# Patient Record
Sex: FEMALE | Race: WHITE | NOT HISPANIC OR LATINO | URBAN - METROPOLITAN AREA
[De-identification: names, ages, dates, MRNs, and addresses within clinical notes are randomized per-mention and may not be internally consistent; named-entity substitution may affect disease eponyms.]

---

## 2022-11-21 ENCOUNTER — NEW PATIENT COMPREHENSIVE (OUTPATIENT)
Dept: URBAN - METROPOLITAN AREA CLINIC 6 | Facility: CLINIC | Age: 87
End: 2022-11-21

## 2022-11-21 DIAGNOSIS — G43.B0: ICD-10-CM

## 2022-11-21 DIAGNOSIS — H35.3132: ICD-10-CM

## 2022-11-21 DIAGNOSIS — Z96.1: ICD-10-CM

## 2022-11-21 PROCEDURE — 92004 COMPRE OPH EXAM NEW PT 1/>: CPT

## 2022-11-21 PROCEDURE — 92015 DETERMINE REFRACTIVE STATE: CPT

## 2022-11-21 ASSESSMENT — VISUAL ACUITY
OD_PH: 20/40
OD_CC: 20/50
OS_CC: 20/60

## 2022-11-21 ASSESSMENT — TONOMETRY
OS_IOP_MMHG: 17
OD_IOP_MMHG: 16

## 2024-01-01 ENCOUNTER — HOSPICE ADMISSION (OUTPATIENT)
Dept: HOME HOSPICE | Facility: HOSPICE | Age: 89
End: 2024-01-01
Payer: MEDICARE

## 2024-01-01 ENCOUNTER — HOME CARE VISIT (OUTPATIENT)
Dept: HOME HEALTH SERVICES | Facility: HOME HEALTHCARE | Age: 89
End: 2024-01-01
Payer: MEDICARE

## 2024-01-01 ENCOUNTER — HOME CARE VISIT (OUTPATIENT)
Dept: HOME HOSPICE | Facility: HOSPICE | Age: 89
End: 2024-01-01
Payer: MEDICARE

## 2024-01-01 ENCOUNTER — TELEPHONE (OUTPATIENT)
Dept: HOME HEALTH SERVICES | Facility: HOME HEALTHCARE | Age: 89
End: 2024-01-01

## 2024-01-01 ENCOUNTER — NURSING HOME VISIT (OUTPATIENT)
Dept: WOUND CARE | Facility: HOSPITAL | Age: 89
End: 2024-01-01
Payer: COMMERCIAL

## 2024-01-01 ENCOUNTER — NURSING HOME VISIT (OUTPATIENT)
Dept: GERIATRICS | Facility: OTHER | Age: 89
End: 2024-01-01
Payer: COMMERCIAL

## 2024-01-01 VITALS
SYSTOLIC BLOOD PRESSURE: 120 MMHG | DIASTOLIC BLOOD PRESSURE: 64 MMHG | TEMPERATURE: 98 F | RESPIRATION RATE: 24 BRPM | HEART RATE: 80 BPM

## 2024-01-01 VITALS — RESPIRATION RATE: 30 BRPM

## 2024-01-01 DIAGNOSIS — T14.8XXA TRAUMATIC INJURY TO SKIN OR SUBCUTANEOUS TISSUE: ICD-10-CM

## 2024-01-01 DIAGNOSIS — R26.2 AMBULATORY DYSFUNCTION: Primary | ICD-10-CM

## 2024-01-01 DIAGNOSIS — L89.150 PRESSURE INJURY OF SACRAL REGION, UNSTAGEABLE (HCC): ICD-10-CM

## 2024-01-01 DIAGNOSIS — Z51.5 HOSPICE CARE PATIENT: ICD-10-CM

## 2024-01-01 DIAGNOSIS — Z51.5 HOSPICE CARE PATIENT: Primary | ICD-10-CM

## 2024-01-01 DIAGNOSIS — J96.21 ACUTE ON CHRONIC RESPIRATORY FAILURE WITH HYPOXIA (HCC): Primary | ICD-10-CM

## 2024-01-01 DIAGNOSIS — T14.8XXA DEGLOVING INJURY: ICD-10-CM

## 2024-01-01 PROCEDURE — G0155 HHCP-SVS OF CSW,EA 15 MIN: HCPCS

## 2024-01-01 PROCEDURE — G0299 HHS/HOSPICE OF RN EA 15 MIN: HCPCS

## 2024-01-01 PROCEDURE — 99308 SBSQ NF CARE LOW MDM 20: CPT | Performed by: NURSE PRACTITIONER

## 2024-01-01 PROCEDURE — 10330057 MEDICATION, GENERAL

## 2024-01-01 PROCEDURE — 10330064 BRIEF, WINGS CHOICE PLUS QUILTED MED (12

## 2024-01-01 PROCEDURE — 10330087 HSPC SERVICE INTENSITY ADD-ON

## 2024-01-01 PROCEDURE — G0156 HHCP-SVS OF AIDE,EA 15 MIN: HCPCS

## 2024-01-01 PROCEDURE — 99309 SBSQ NF CARE MODERATE MDM 30: CPT | Performed by: NURSE PRACTITIONER

## 2024-01-01 RX ORDER — LORAZEPAM 0.5 MG/1
TABLET ORAL
Qty: 30 TABLET | Refills: 0 | Status: SHIPPED | OUTPATIENT
Start: 2024-01-01 | End: 2024-03-01

## 2024-01-01 RX ORDER — MORPHINE SULFATE 20 MG/ML
SOLUTION ORAL
Qty: 30 ML | Refills: 0 | Status: SHIPPED | OUTPATIENT
Start: 2024-01-01 | End: 2024-03-01

## 2024-01-01 RX ORDER — MORPHINE SULFATE 100 MG/5ML
5 SOLUTION ORAL EVERY 2 HOUR PRN
COMMUNITY
End: 2024-01-01 | Stop reason: DRUGHIGH

## 2024-01-02 ENCOUNTER — NURSING HOME VISIT (OUTPATIENT)
Dept: GERIATRICS | Facility: OTHER | Age: 89
End: 2024-01-02
Payer: COMMERCIAL

## 2024-01-02 VITALS
WEIGHT: 74 LBS | HEART RATE: 69 BPM | SYSTOLIC BLOOD PRESSURE: 119 MMHG | RESPIRATION RATE: 20 BRPM | DIASTOLIC BLOOD PRESSURE: 58 MMHG | TEMPERATURE: 97.2 F

## 2024-01-02 DIAGNOSIS — Z51.5 PALLIATIVE CARE PATIENT: ICD-10-CM

## 2024-01-02 DIAGNOSIS — I48.0 PAROXYSMAL ATRIAL FIBRILLATION (HCC): ICD-10-CM

## 2024-01-02 DIAGNOSIS — E03.9 ACQUIRED HYPOTHYROIDISM: ICD-10-CM

## 2024-01-02 DIAGNOSIS — I50.32 CHRONIC DIASTOLIC HEART FAILURE (HCC): ICD-10-CM

## 2024-01-02 DIAGNOSIS — S72.002G CLOSED FRACTURE OF LEFT HIP WITH DELAYED HEALING, SUBSEQUENT ENCOUNTER: Primary | ICD-10-CM

## 2024-01-02 DIAGNOSIS — E87.6 HYPOKALEMIA: ICD-10-CM

## 2024-01-02 DIAGNOSIS — I10 PRIMARY HYPERTENSION: ICD-10-CM

## 2024-01-02 DIAGNOSIS — L89.153 PRESSURE INJURY OF SACRAL REGION, STAGE 3 (HCC): ICD-10-CM

## 2024-01-02 DIAGNOSIS — N18.31 STAGE 3A CHRONIC KIDNEY DISEASE (CKD) (HCC): ICD-10-CM

## 2024-01-02 PROBLEM — S72.002A CLOSED FRACTURE OF LEFT HIP (HCC): Status: ACTIVE | Noted: 2024-01-02

## 2024-01-02 PROCEDURE — 99306 1ST NF CARE HIGH MDM 50: CPT | Performed by: FAMILY MEDICINE

## 2024-01-02 RX ORDER — ATORVASTATIN CALCIUM 20 MG/1
20 TABLET, FILM COATED ORAL DAILY
COMMUNITY

## 2024-01-02 RX ORDER — LEVOTHYROXINE SODIUM 88 UG/1
88 TABLET ORAL DAILY
COMMUNITY

## 2024-01-02 RX ORDER — TORSEMIDE 20 MG/1
20 TABLET ORAL DAILY
COMMUNITY

## 2024-01-02 RX ORDER — MELATONIN/PYRIDOXINE HCL (B6) 10 MG-10MG
10 TABLET,IMMED, EXTENDED RELEASE, BIPHASIC ORAL
COMMUNITY

## 2024-01-02 RX ORDER — METOPROLOL SUCCINATE 25 MG/1
25 TABLET, EXTENDED RELEASE ORAL DAILY
COMMUNITY

## 2024-01-02 RX ORDER — AMLODIPINE BESYLATE 5 MG/1
5 TABLET ORAL DAILY
COMMUNITY
End: 2024-01-10 | Stop reason: ALTCHOICE

## 2024-01-02 RX ORDER — POTASSIUM CHLORIDE 750 MG/1
10 TABLET, EXTENDED RELEASE ORAL DAILY
COMMUNITY
End: 2024-01-08 | Stop reason: DRUGHIGH

## 2024-01-02 NOTE — PROGRESS NOTES
[facility]  [address]  [place] [address]  [facility] 31  History and Physical    NAME: Amanda Orr  AGE: 89 y.o. SEX: female [id number]    DATE OF ENCOUNTER: 1/2/2023    Pain: from wounds on LLE  Rehab Potential:poor  Patient Informed of Medical Condition: yes  Patient is Capable of Understanding Their Right: yes  Prognosis:poor  Discharge Plan: pending OT/PT eval, goal is going home with son  Surrogate Decision Maker: Ayd mitchell  Advanced Directives: yes  Code status:DNR/DNI  PCP: Billy Bolivar MD     Assessment and Plan     Chronic diastolic heart failure (HCC)  Wt Readings from Last 3 Encounters:   01/02/24 33.6 kg (74 lb)   End-stage CHF, following [place] [facility]  Follows cardiology, last visit was 12/7/23. Note reviewed.  Echo 4/12/23 revealed systolic function is low normal with an ejection fraction of 50-55%. There is grade III (severe) diastolic dysfunction.   Continue torsemide 20 mg daily  Monitor vitals closely, BMP    Paroxysmal atrial fibrillation (HCC)  RRR  Continue toprol 25 mg daily and AC with Eliquis 2.5 mg bid. No adverse bleeding noted.    Primary hypertension  Bp stable  Continue amlodipine 5 mg qd, toprol in the setting of Afib and torsemide in the setting of CHF  Monitor vitals closely. Avoid hypotension. Hold parameter ordered.    Palliative care patient  Follows [place] [facility]. Note on 11/20/23 reviewed.    Stage 3a chronic kidney disease (CKD) (HCC)  Stable with creatinine 1.15  Avoid hypotension, avoid NSAIDs  Monitor BMP    Hypokalemia  Low K 3.1 on CMP today 1/2/24  K-dur 20 mEq po qd x2 doses repleted.  Repeat BMP 1/5/24. Order placed.    Pressure injury of sacral region, stage 3 (HCC)  Local wound care  Wound Care consult    Closed fracture of left hip (HCC)  S/p L hemiarthroplasty 12/24, WBAT  Continue Tylenol prn for pain  Outpatient fu with Orthopedics.    Acquired hypothyroidism  Continue levothyroxine 88  mcg daily    All medications and routine orders were reviewed and updated as needed.    Plan discussed with: Patient. Voice messagea left for son. Coordination of care with nursing, OT/PT, SW.    Chief Complaint     Seen for admission at Nursing Facility    History of Present Illness     89 y.o. female who is seen today, following hospitalization at [facility] from 12/23/2023 to 12/31/2023 after a fall sustaining left femur fracture, s/p L hip hemiarthroplasty. Her hospital course was complicated with hypoxia requiring oxygen therapy, acute on chronic end-stage CHF for which cardiology was consulted. Her respiratory status has improved and oxygen was weaned off. She was d/c to [facility].   She is sitting in recliner, c/o pain in LE. She states she lives with her son at home. Uses wheelchair at baseline.   She denies CP, SOB, or palpitations.    HISTORY:  Social History     Socioeconomic History    Marital status: Single     Spouse name: None    Number of children: None     Social Determinants of Health     Financial Resource Strain: Low Risk  (6/5/2023)    Received from [facility]    Overall Financial Resource Strain (CARDIA)     Difficulty of Paying Living Expenses: Not hard at all   Food Insecurity: No Food Insecurity (6/5/2023)    Received from [facility]    Hunger Vital Sign     Worried About Running Out of Food in the Last Year: Never true     Ran Out of Food in the Last Year: Never true   Transportation Needs: No Transportation Needs (6/5/2023)    Received from [facility]    PRAPARE - Transportation     Lack of Transportation (Medical): No     Lack of Transportation (Non-Medical): No   Physical Activity: Not on file   Stress: Not on file   Social Connections: Not on file   Intimate Partner Violence: Not At Risk (6/5/2023)    Received from [facility]    Humiliation, Afraid, Rape, and Kick questionnaire     Fear of Current or Ex-Partner: No      Emotionally Abused: No     Physically Abused: No     Sexually Abused: No   Housing Stability: Low Risk  (6/5/2023)    Received from [facility]    Housing Stability Vital Sign     Unable to Pay for Housing in the Last Year: No     Number of Places Lived in the Last Year: 1     Unstable Housing in the Last Year: No     [place] of Systems     Review of Systems   Cardiovascular:  Negative for chest pain.   Gastrointestinal:  Negative for abdominal pain.   Genitourinary:  Negative for dysuria.   Skin:  Positive for color change and wound.   Neurological:  Negative for headaches.   All other systems reviewed and are negative.    Medications and orders     All medications reviewed and updated in residential EMR.    Objective     Vitals:    01/02/24 0730   BP: 119/58   Pulse: 69   Resp: 20   Temp: (!) 97.2 °F (36.2 °C)   Weight: 33.6 kg (74 lb)        Physical Exam  Vitals and nursing note reviewed.   Constitutional:       General: She is not in acute distress.     Comments: cachectic   HENT:      Head: Normocephalic.      Nose: Nose normal.      Mouth/Throat:      Mouth: Mucous membranes are moist.   Eyes:      General:         Right eye: No discharge.         Left eye: No discharge.      Conjunctiva/sclera: Conjunctivae normal.   Cardiovascular:      Rate and Rhythm: Normal rate and regular rhythm.      Heart sounds: Murmur heard.   Pulmonary:      Effort: Pulmonary effort is normal.      Breath sounds: Rales (b/l) present.   Abdominal:      General: Bowel sounds are normal. There is no distension.      Palpations: Abdomen is soft.      Tenderness: There is no abdominal tenderness.   Musculoskeletal:         General: Deformity (b/l hands DIP and PIP deviation 2/2 arthritis) present.      Cervical back: Neck supple.      Right lower leg: No edema.      Left lower leg: No edema.   Skin:     General: Skin is warm.      Findings: Bruising (ecchymoses left chest, b/l arms) and lesion (7cm laceration with  scab anterior LLE) present.      Comments: Pressure injuries posterior b/l LE, serosanguineous discharge   Neurological:      Mental Status: She is alert and oriented to person, place, and time.   Psychiatric:         Behavior: Behavior normal.       Pertinent Laboratory/Diagnostic Studies:   The following labs/studies were reviewed please see chart or hospital paperwork for details.  CBC 1/2/24 with WBC 6.1, Hgb 10.1, Hct 32, , MCV 82, MCH 25.4  BMP BUN 62, Creatinine 1.15, Na 144, K 3.1.  CBC 12/29/23 with WBC 8.5, Hgb 9.1, MCV 81.   BMP 12/29/23 Na 141, K 5.3, Cre 1.56    I have spent 75 minutes with Patient and family today including taking history from  patient, review of records, charting, and counseling regarding diagnosis and management of conditions.    Martha Romero MD  1/2/2024

## 2024-01-03 NOTE — ASSESSMENT & PLAN NOTE
Low K 3.1 on CMP today 1/2/24  K-dur 20 mEq po qd x2 doses repleted.  Repeat BMP 1/5/24. Order placed.

## 2024-01-03 NOTE — ASSESSMENT & PLAN NOTE
Bp stable  Continue amlodipine 5 mg qd, toprol in the setting of Afib and torsemide in the setting of CHF  Monitor vitals closely. Avoid hypotension. Hold parameter ordered.

## 2024-01-03 NOTE — ASSESSMENT & PLAN NOTE
Wt Readings from Last 3 Encounters:   01/02/24 33.6 kg (74 lb)   End-stage CHF, following [place] [facility]  Follows cardiology, last visit was 12/7/23. Note reviewed.  Echo 4/12/23 revealed systolic function is low normal with an ejection fraction of 50-55%. There is grade III (severe) diastolic dysfunction.   Continue torsemide 20 mg daily  Monitor vitals closely, BMP   Mr. Mcmahon

## 2024-01-04 ENCOUNTER — NURSING HOME VISIT (OUTPATIENT)
Dept: GERIATRICS | Facility: OTHER | Age: 89
End: 2024-01-04
Payer: COMMERCIAL

## 2024-01-04 DIAGNOSIS — S72.002D CLOSED FRACTURE OF LEFT HIP WITH ROUTINE HEALING, SUBSEQUENT ENCOUNTER: Primary | ICD-10-CM

## 2024-01-04 DIAGNOSIS — I10 PRIMARY HYPERTENSION: ICD-10-CM

## 2024-01-04 DIAGNOSIS — Z51.5 PALLIATIVE CARE PATIENT: ICD-10-CM

## 2024-01-04 DIAGNOSIS — L89.153 PRESSURE INJURY OF SACRAL REGION, STAGE 3 (HCC): ICD-10-CM

## 2024-01-04 DIAGNOSIS — N18.4 CKD (CHRONIC KIDNEY DISEASE), STAGE IV (HCC): ICD-10-CM

## 2024-01-04 DIAGNOSIS — I48.0 PAROXYSMAL ATRIAL FIBRILLATION (HCC): ICD-10-CM

## 2024-01-04 DIAGNOSIS — E87.6 HYPOKALEMIA: ICD-10-CM

## 2024-01-04 DIAGNOSIS — R53.81 DEBILITY: ICD-10-CM

## 2024-01-04 DIAGNOSIS — I50.32 CHRONIC DIASTOLIC HEART FAILURE (HCC): ICD-10-CM

## 2024-01-04 PROBLEM — E03.9 ACQUIRED HYPOTHYROIDISM: Status: ACTIVE | Noted: 2024-01-04

## 2024-01-04 PROCEDURE — 99309 SBSQ NF CARE MODERATE MDM 30: CPT | Performed by: NURSE PRACTITIONER

## 2024-01-04 NOTE — PROGRESS NOTES
Facility: [facility]  POS: 31 (STR)  Progress Note    Chief Complaint/Reason for visit: STR follow up visit  Code status: Unknown at this time  History of Present Illness: 89-year-old female seen and examined for STR follow-up of acute and chronic medical conditions.  Received patient seated in chair and appeared in no distress.  Patient's son was present and did not have any questions.  Patient reports feeling frustrated over slow progress.  Reassured patient that it will take some time and that is why she is at the rehab center.  See A/P for additional information.  Past Medical History:   Chronic diastolic heart failure, paroxysmal atrial fibrillation, mild to moderate mitral regurgitation, mixed hyperlipidemia, essential hypertension, CKD stage IV, chronic anticoagulation, history of chronic left posterior rib fractures, hiatal anemia, chronic T3, T4 compression fractures, T6, T7, and T10 SP compression fractures, T11 severe compression deformity, T6 kyphosis  Family History: unchanged from history and physical  Social History: unchanged from history and physical  Review of systems: As per review of medical illness, all other systems reviewed and negative.  Medications: All medication and routine orders were reviewed and updated  Allergies: Reviewed and unchanged  Consults reviewed:PT, OT, and Other cardiology notes  Labs/Diagnostics (reviewed by this provider): Copy in Chart    Imaging Reviewed: None today   Physical Exam  Weight: 73.5 pounds     Temp: 98           BP: 92/45  pulse: 77 resp: 18      Constitutional: Cachetic  Orientation:Person and Place     Physical Exam  Vitals and nursing note reviewed.   Constitutional:       General: She is not in acute distress.     Appearance: She is not toxic-appearing or diaphoretic.      Comments: Frail and thin female who appears with chronic illness.  In no distress.   HENT:      Head: Normocephalic.      Ears:      Comments: Hard of hearing.     Nose: No  congestion.      Mouth/Throat:      Mouth: Mucous membranes are moist.      Pharynx: No oropharyngeal exudate.   Eyes:      Extraocular Movements: Extraocular movements intact.      Conjunctiva/sclera: Conjunctivae normal.   Cardiovascular:      Rate and Rhythm: Normal rate.   Pulmonary:      Effort: Pulmonary effort is normal. No respiratory distress.   Abdominal:      General: Bowel sounds are normal. There is no distension.      Palpations: Abdomen is soft.      Tenderness: There is no abdominal tenderness. There is no guarding.   Musculoskeletal:      Cervical back: Neck supple. No rigidity.      Right lower leg: No edema.      Left lower leg: No edema.      Comments: Moves all 4 extremities.   Skin:     General: Skin is warm and dry.      Capillary Refill: Capillary refill takes less than 2 seconds.      Comments: Left hip surgical wound intact with staples, pressure ulcer on sacrum, skin tear on left elbow, skin tear right lateral calf, skin tear left lateral calf.   Neurological:      Mental Status: She is alert and oriented to person, place, and time.      Motor: Weakness present.      Gait: Gait abnormal.   Psychiatric:         Behavior: Behavior normal.         Thought Content: Thought content normal.       Assessment/Plan:  89-year-old female with:    Closed fracture of left hip  S/p fall sustaining left femur fracture  S/p left hip hemiarthroplasty at [facility]  Patient is on Eliquis twice daily for atrial fibrillation which also serves as DVT prophylaxis  Recent CBC stable 1/2/2024  Continue weightbearing as tolerated left lower extremity  Nursing to monitor for signs and symptoms of infection left hip surgical site  Follow-up with orthopedics as scheduled outpatient    Chronic diastolic heart failure  With recent acute on chronic CHF inpatient, evaluated by cardiology  Echo 4/11/2023: LVEF 50 to 55%.  There is grade 3 diastolic dysfunction.  There is mild to moderate mitral valve  regurgitation  Euvolemic on exam  Continue torsemide 20 mg daily; will add hold parameter due to soft blood pressures  Patient received potassium supplement 20 meq x2 doses for serum potassium level of 3.1 on 1/2/2024  Routine follow-up with cardiology outpatient    Hypokalemia  Serum potassium level 3.1 on 1/2/2024  Order potassium supplement 20 meq daily   Scheduled BMP left femur fracture on 1/5    Primary hypertension  With soft BPs noted  Will discontinue Norvasc  Patient is currently on torsemide 20 mg daily for CHF  Monitor BP trends    Paroxysmal atrial fibrillation  Heart rate stable on exam  Continue metoprolol succinate 25 mg daily; will add hold parameter  Follow-up with [facility] cardiology outpatient    Stage IV chronic kidney disease  Baseline creatinine approximately 1.5-1.7 per record review  Most recent creatinine 1.15/GFR 13 on 1/2/2024  Avoid nephrotoxic medications such as NSAIDs  Avoid hypotension  Ensure adequate hydration  Continue to monitor BMP trends    Pressure injury of sacral region, stage III  Has air mattress   Currently managed by SNF wound team  Frequent repositioning/offloading  Order nutritional supplements to help with healing  Consult [facility] wound CRNP    Palliative care patient  Follows [facility] palliative care service    Debility  Multifactorial  Continue supportive care at SNF for ADLs  Continue PT/OT  Continue safety/fall precautions  Ensure adequate hydration and nutrition  Skin breakdown prevention measures in place by nursing staff such as air mattress, heel protectors     This note was completed in part utilizing [employer] direct voice recognition software.  Grammatical errors, random word insertion, spelling mistakes, and incomplete sentences may be an occasional consequence of the system secondary to software limitations, ambient noise and hardware issues.  At the time of dictation, efforts were made to edit, clarify and/or correct errors.   Please read the chart carefully and recognize, using context, where substitutions have occurred.  If you have any questions or concerns about the context, text or information contained within the body of this dictation, please contact myself, the provider, for further clarification.    YOAN Wade  1/5/20248:56 PM

## 2024-01-08 ENCOUNTER — NURSING HOME VISIT (OUTPATIENT)
Dept: GERIATRICS | Facility: OTHER | Age: 89
End: 2024-01-08
Payer: COMMERCIAL

## 2024-01-08 DIAGNOSIS — S72.002D CLOSED FRACTURE OF LEFT HIP WITH ROUTINE HEALING, SUBSEQUENT ENCOUNTER: Primary | ICD-10-CM

## 2024-01-08 DIAGNOSIS — I48.0 PAROXYSMAL ATRIAL FIBRILLATION (HCC): ICD-10-CM

## 2024-01-08 DIAGNOSIS — N18.4 CKD (CHRONIC KIDNEY DISEASE), STAGE IV (HCC): ICD-10-CM

## 2024-01-08 DIAGNOSIS — R53.81 DEBILITY: ICD-10-CM

## 2024-01-08 DIAGNOSIS — I50.32 CHRONIC DIASTOLIC HEART FAILURE (HCC): ICD-10-CM

## 2024-01-08 DIAGNOSIS — E87.6 HYPOKALEMIA: ICD-10-CM

## 2024-01-08 DIAGNOSIS — I10 PRIMARY HYPERTENSION: ICD-10-CM

## 2024-01-08 PROCEDURE — 99309 SBSQ NF CARE MODERATE MDM 30: CPT | Performed by: NURSE PRACTITIONER

## 2024-01-08 RX ORDER — POTASSIUM CHLORIDE 1.5 G/1.58G
20 POWDER, FOR SOLUTION ORAL DAILY
COMMUNITY
End: 2024-01-10 | Stop reason: ALTCHOICE

## 2024-01-08 NOTE — ASSESSMENT & PLAN NOTE
Amlodipine was discontinued due to soft BPs  Continue Toprol in the setting of atrial fibrillation and torsemide in the setting of CHF  Continue to monitor BP trends and adjust medications if clinically indicated

## 2024-01-08 NOTE — ASSESSMENT & PLAN NOTE
S/p Left hemiarthroplasty 12/24/2023   Neurovascular status intact to left lower extremity  No complaints of pain at time of exam  Continue Tylenol as needed for pain  Continue PT/OT  Follow-up with orthopedics outpatient

## 2024-01-08 NOTE — ASSESSMENT & PLAN NOTE
S/p L hemiarthroplasty 12/24, WBAT  Continue Tylenol prn for pain  Outpatient fu with Orthopedics.   PAST MEDICAL HISTORY:  GERD (gastroesophageal reflux disease)     H/O left bundle branch block     H/O pneumothorax repaired with pleurodesis    H/O: osteoarthritis     History of IBS     HTN (hypertension)     Hypothyroid     Sjogren's disease

## 2024-01-08 NOTE — ASSESSMENT & PLAN NOTE
Multifactorial  Continue supportive care at SNF  Continue PT/OT  Continue safety/fall precautions  Ensure adequate hydration and nutrition

## 2024-01-08 NOTE — ASSESSMENT & PLAN NOTE
Most recent creatinine 1.31 on 1/5/2024  Estimated creatinine clearance of 28 ML/ Min as per Cockcroft-Gault formula  Avoid nephrotoxic medications  Avoid hypotension  Ensure adequate hydration   Monitor BMP trends

## 2024-01-08 NOTE — ASSESSMENT & PLAN NOTE
Wt Readings from Last 3 Encounters:   01/02/24 33.6 kg (74 lb)   Euvolemic on exam  Continue torsemide 20 mg daily; appetite and oral intake has been poor so we will monitor closely for dehydration as patient may not need torsemide  Potassium chloride was ordered on 1/4/2024 but was never started by nursing staff?  Katty ordered potassium chloride 20 mEq daily and check BMP in a.m.  Continue CHF pathway/daily weights

## 2024-01-08 NOTE — ASSESSMENT & PLAN NOTE
Heart rate stable  Continue Toprol XL 25 mg daily  Continue Eliquis 2.5 mg twice daily  Recent CBC stable

## 2024-01-08 NOTE — PROGRESS NOTES
Facility: [facility]  POS: 31 (STR)  Progress Note    Chief Complaint/Reason for visit: STR follow up visit  Code status: Full code  History of Present Illness: 89-year-old female seen and examined for STR follow-up of acute and chronic medical conditions.  Received patient resting in bed and appeared in no distress.  Patient denies having pain or discomfort at time of exam.  No nonverbal signs of pain noted.  Nursing reports that patient is a max assist of 2 for ADLs.  Patient reports feeling short of breath at times which is not new.  Respiratory status stable at time of exam.  Nursing reports that patient has been coughing on thin liquids and speech therapist consulted.  See A/P for additional information.  Past Medical History: unchanged from history and physical  Family History: unchanged from history and physical  Social History: unchanged from history and physical  Review of systems: As per review of medical illness, all other systems reviewed and negative.  Medications: All medication and routine orders were reviewed and updated  Allergies: Reviewed and unchanged  Consults reviewed:PT, OT, and Other  Labs/Diagnostics (reviewed by this provider): Copy in Chart    Imaging Reviewed: None today  Physical Exam  Weight: 72.4 lb Temp: 98          BP: 120/68  Pulse: 56 Resp: 18       Constitutional: Cachetic  Orientation:Person and Place     Physical Exam  Vitals and nursing note reviewed.   Constitutional:       General: She is not in acute distress.     Appearance: She is not toxic-appearing or diaphoretic.      Comments: Frail and thin elderly female who appears with chronic illness.  In no distress.   HENT:      Head: Normocephalic.      Nose: No congestion.      Mouth/Throat:      Mouth: Mucous membranes are moist.      Pharynx: No oropharyngeal exudate.   Cardiovascular:      Rate and Rhythm: Normal rate and regular rhythm.   Pulmonary:      Effort: Pulmonary effort is normal. No respiratory distress.       Comments: Short of breath on exertion.  Abdominal:      General: Bowel sounds are normal. There is no distension.      Palpations: Abdomen is soft.      Tenderness: There is no abdominal tenderness. There is no guarding.   Musculoskeletal:      Cervical back: Neck supple. No rigidity.      Right lower leg: No edema.      Left lower leg: No edema.      Comments: Moves all 4 extremities.   Skin:     General: Skin is warm and dry.      Capillary Refill: Capillary refill takes less than 2 seconds.      Comments: Surgical incision left hip with staples intact.  Pressure ulcer sacrum.  Skin tears lower extremities.   Neurological:      Mental Status: She is alert. Mental status is at baseline.      Motor: Weakness present.   Psychiatric:         Mood and Affect: Mood normal.         Behavior: Behavior normal.         Thought Content: Thought content normal.       Assessment/Plan:  89-year-old female with:    Closed fracture of left hip (HCC)  S/p Left hemiarthroplasty 12/24/2023   Neurovascular status intact to left lower extremity  No complaints of pain at time of exam  Patient is on Eliquis for atrial fibrillation which also serves as DVT prophylaxis  Continue Tylenol as needed for pain  Continue PT/OT  Follow-up with orthopedics outpatient    Chronic diastolic heart failure (HCC)  Wt Readings from Last 3 Encounters:   01/02/24 33.6 kg (74 lb)   With 2.4 lb weight loss since 1/1/2024 he has not hide  Continue torsemide 20 mg daily; appetite and oral intake has been poor so we will monitor closely for dehydration as patient may not need torsemide  Potassium chloride was ordered on 1/4/2024 but was never started by nursing staff?  Katty ordered potassium chloride 20 mEq daily and check BMP in a.m.  Continue CHF pathway/daily weights    Hypokalemia  In setting of diuretic use  Potassium supplement 20 mEq ordered daily  Check BMP in a.m.    Paroxysmal atrial fibrillation  Heart rate stable  Continue Toprol XL 25 mg  daily  Continue Eliquis 2.5 mg twice daily  Recent CBC stable    CKD (chronic kidney disease), stage IV (HCC)  Most recent creatinine 1.31 on 1/5/2024  Estimated creatinine clearance of 28 ML/ Min as per Cockcroft-Gault formula  Avoid nephrotoxic medications  Avoid hypotension  Ensure adequate hydration   Monitor BMP trends    Primary hypertension  Amlodipine was discontinued due to soft BPs  Continue Toprol in the setting of atrial fibrillation and torsemide in the setting of CHF  Continue to monitor BP trends and adjust medications if clinically indicated    Debility  Multifactorial  Continue supportive care at SNF  Continue PT/OT  Continue safety/fall precautions  Ensure adequate hydration and nutrition     This note was completed in part utilizing [employer] direct voice recognition software.  Grammatical errors, random word insertion, spelling mistakes, and incomplete sentences may be an occasional consequence of the system secondary to software limitations, ambient noise and hardware issues.  At the time of dictation, efforts were made to edit, clarify and/or correct errors.  Please read the chart carefully and recognize, using context, where substitutions have occurred.  If you have any questions or concerns about the context, text or information contained within the body of this dictation, please contact myself, the provider, for further clarification.    YOAN Wade  1/8/20243:20 PM

## 2024-01-09 ENCOUNTER — NURSING HOME VISIT (OUTPATIENT)
Dept: WOUND CARE | Facility: HOSPITAL | Age: 89
End: 2024-01-09
Payer: COMMERCIAL

## 2024-01-09 DIAGNOSIS — R26.2 AMBULATORY DYSFUNCTION: ICD-10-CM

## 2024-01-09 DIAGNOSIS — T14.8XXA TRAUMATIC INJURY TO SKIN OR SUBCUTANEOUS TISSUE: ICD-10-CM

## 2024-01-09 DIAGNOSIS — L89.153 PRESSURE INJURY OF SACRAL REGION, STAGE 3 (HCC): Primary | ICD-10-CM

## 2024-01-09 DIAGNOSIS — T14.8XXA DEGLOVING INJURY: ICD-10-CM

## 2024-01-09 PROCEDURE — 99305 1ST NF CARE MODERATE MDM 35: CPT | Performed by: NURSE PRACTITIONER

## 2024-01-10 ENCOUNTER — NURSING HOME VISIT (OUTPATIENT)
Dept: GERIATRICS | Facility: OTHER | Age: 89
End: 2024-01-10
Payer: COMMERCIAL

## 2024-01-10 DIAGNOSIS — I48.0 PAROXYSMAL ATRIAL FIBRILLATION (HCC): ICD-10-CM

## 2024-01-10 DIAGNOSIS — I50.32 CHRONIC DIASTOLIC HEART FAILURE (HCC): Primary | ICD-10-CM

## 2024-01-10 DIAGNOSIS — S72.002D CLOSED FRACTURE OF LEFT HIP WITH ROUTINE HEALING, SUBSEQUENT ENCOUNTER: ICD-10-CM

## 2024-01-10 DIAGNOSIS — L89.153 PRESSURE INJURY OF SACRAL REGION, STAGE 3 (HCC): ICD-10-CM

## 2024-01-10 DIAGNOSIS — N18.4 CKD (CHRONIC KIDNEY DISEASE), STAGE IV (HCC): ICD-10-CM

## 2024-01-10 PROCEDURE — 99309 SBSQ NF CARE MODERATE MDM 30: CPT | Performed by: NURSE PRACTITIONER

## 2024-01-10 NOTE — PROGRESS NOTES
[facility] WOUND CARE MANAGEMENT   AND HYPERBARIC MEDICINE CENTER       Patient ID: Amanda Orr is a 89 y.o. female Date of Birth 5/7/1926     Location of Service: [facility]    Performed wound round with: Wound team     Chief Complaint : Multiple wounds    Wound Instructions:  Wound:  Sacrum  Discontinue previous wound order  Cleanse the wound bed with NSS   Apply non-sting skin prep to periwound area  Apply dynashield to wound bed, then cover with mepilex   Frequency : daily and prn for soiling    Wound : Left lateral lower leg and right lateral lower leg  Cleanse with normal saline solution or wound cleanser  Apply Skin-Prep to periwound area  Apply hydrogel to wound bed and cover with ABD and rolled gauze  Daily and as needed for soiling    Wound: Left lower leg anterior, right lower leg anterior, right medial knee  Cleanse with normal saline solution or wound cleanser  Apply Betadine to wound bed  Cover with rolled gauze  Daily and as needed for soiling    Offload all wounds  Turn and reposition frequently,   Increase protein intake.  Monitor for any sign of infection or worsening, inform PCP or patient's primary physician in your facility.      Allergies  Patient has no allergy information on record.      Assessment & Plan:  1. Pressure injury of sacral region, stage 3 (HCC)  Assessment & Plan:  Sacral wound  Full-thickness, with no obvious sign of infection  Local wound care with Myra shield  Continue to offload  Increase protein intake  Follow-up next week        2. Ambulatory dysfunction  Assessment & Plan:  On 24/7 restorative care      3. Degloving injury  Assessment & Plan:  Left lower leg anterior  Covered with eschar, 0.3 depth, with no obvious sign of infection  Local wound care with Betadine  Continue to offload  Follow-up next week      4. Traumatic injury to skin or subcutaneous tissue  Assessment & Plan:  Bilateral lower legs  The wound bed on the left lateral calf and right lateral  calf is granulation, with no drainage, dry,  Local wound care with hydrogel  The wound bed on right anterior lower leg and right medial knee is covered with eschar, local wound care with Betadine  Continue to offload  Increase protein intake  Follow-up next week                 Subjective:   1/9/2024This is a 89 y.o., female referred to our service for wound/ skin alterations on bilateral lower legs and sacrum.Patient have a complex medical history including but not limited to closed fracture of left hip and hypokalemia. Patient was referred by Senior Care Team. Patient was seen in collaboration with the facility wound team.     Wound History: Patient is a poor historian and was not able to provide information related to the wound.  It is not clear where the wound on the bilateral lower legs came from.  Patient previously under the care of hospice.  The wound on the sacrum is pressure ulcer.    Received patient on recliner chair, seems comfortable.  Patient needs assistance with turning and repositioning in bed.  Patient is incontinent of both bowel and bladder.            Review of Systems   Constitutional: Negative.    Respiratory: Negative.     Cardiovascular: Negative.    Musculoskeletal:  Positive for gait problem.   Skin:  Positive for wound.   Psychiatric/Behavioral: Negative.         Objective:    Physical Exam  Constitutional:       Comments: Seems frail   Cardiovascular:      Rate and Rhythm: Normal rate.   Pulmonary:      Effort: Pulmonary effort is normal.   Musculoskeletal:      Comments: LROM   Skin:     Findings: Lesion present.      Comments: Left lower leg anterior: Wound size is 16 x 0.1 x 0.3 cm.,  100% eschar, no drainage, periwound normal, with no obvious sign of infection    Left lateral calf: Wound size is 4 x 2 x 0.1 cm.,  100% granulation, no drainage, periwound normal, no obvious sign of infection    Right lower leg anterior: Wound size is 2 x 1 x 0.1 cm.,  100% eschar, no drainage, no  obvious sign of infection    Left elbow: Wound is healed    Right lateral calf: Wound size is 5 x 2 x 0.1 cm.,  10% slough, 90% granulation, periwound is normal, with no obvious sign of infection, no drainage     Sacrum: Wound size is 2 x 2 times point 1 cm.,  100% red, small amount of serous drainage, periwound is normal, with no obvious sign of infection    Buttocks: Wound is healed              Procedures           Patient's care was coordinated with nursing facility staff. Recent vitals, labs and updated medications were reviewed on EMR or chart system of facility. Past Medical, surgical, social, medication and allergy history and patient's previous records were reviewed and updated as appropriate: Most up-to date information is available in the facility EMR where the patient is currently admitted.    Patient Active Problem List   Diagnosis    Chronic diastolic heart failure ([facility])    Palliative care patient    Paroxysmal atrial fibrillation ([facility])    Primary hypertension    Stage 3a chronic kidney disease (CKD) ([facility])    Closed fracture of left hip ([facility])    Pressure injury of sacral region, stage 3 ([facility])    Hypokalemia    CKD (chronic kidney disease), stage IV ([facility])    Debility    Acquired hypothyroidism    Degloving injury    Ambulatory dysfunction    Traumatic injury to skin or subcutaneous tissue     History reviewed. No pertinent past medical history.  History reviewed. No pertinent surgical history.  Social History     Socioeconomic History    Marital status: Single     Spouse name: None    Number of children: None    Years of education: None    Highest education level: None   Occupational History    None   Tobacco Use    Smoking status: Former     Types: Cigarettes    Smokeless tobacco: Never   Substance and Sexual Activity    Alcohol use: None    Drug use: None    Sexual activity: None   Other Topics Concern    None   Social History Narrative    None     Social Determinants of Health     Financial Resource  Strain: Low Risk  (6/5/2023)    Received from [facility]    Overall Financial Resource Strain (CARDIA)     Difficulty of Paying Living Expenses: Not hard at all   Food Insecurity: No Food Insecurity (6/5/2023)    Received from [facility]    Hunger Vital Sign     Worried About Running Out of Food in the Last Year: Never true     Ran Out of Food in the Last Year: Never true   Transportation Needs: No Transportation Needs (6/5/2023)    Received from [facility]    PRAPARE - Transportation     Lack of Transportation (Medical): No     Lack of Transportation (Non-Medical): No   Physical Activity: Not on file   Stress: Not on file   Social Connections: Not on file   Intimate Partner Violence: Not At Risk (6/5/2023)    Received from [facility]    Humiliation, Afraid, Rape, and Kick questionnaire     Fear of Current or Ex-Partner: No     Emotionally Abused: No     Physically Abused: No     Sexually Abused: No   Housing Stability: Low Risk  (6/5/2023)    Received from [facility]    Housing Stability Vital Sign     Unable to Pay for Housing in the Last Year: No     Number of Places Lived in the Last Year: 1     Unstable Housing in the Last Year: No        Current Outpatient Medications:     amLODIPine (NORVASC) 5 mg tablet, Take 5 mg by mouth daily, Disp: , Rfl:     apixaban (Eliquis) 2.5 mg, Take 2.5 mg by mouth 2 (two) times a day, Disp: , Rfl:     atorvastatin (LIPITOR) 20 mg tablet, Take 20 mg by mouth daily, Disp: , Rfl:     calcium carbonate-vitamin D 250 mg-3.125 mcg per tablet, Take 1 tablet by mouth 2 (two) times a day, Disp: , Rfl:     levothyroxine (Synthroid) 88 mcg tablet, Take 88 mcg by mouth daily, Disp: , Rfl:     Melatonin 10-10 MG TBCR, Take 10 mg by mouth daily at bedtime, Disp: , Rfl:     metoprolol succinate (TOPROL-XL) 25 mg 24 hr tablet, Take 25 mg by mouth daily, Disp: , Rfl:     potassium chloride (KLOR-CON) 20 mEq  "packet, Take 20 mEq by mouth daily, Disp: , Rfl:     torsemide (DEMADEX) 20 mg tablet, Take 20 mg by mouth daily, Disp: , Rfl:     traMADol-acetaminophen (ULTRACET) 37.5-325 mg per tablet, Take 1 tablet by mouth every 8 (eight) hours as needed for moderate pain, Disp: , Rfl:   History reviewed. No pertinent family history.           Coordination of Care: Wound team aware of the treatment plan. Facility nurse will provide wound treatment and monitor the wound for any changes.     Patient / Staff education : Patient / Staff was given education on sign of infection and pressure ulcer prevention. Patient/ Staff verbalized understanding     Follow up :  Next week    Voice-recognition software may have been used in the preparation of this document. Occasional wrong word or \"sound-alike\" substitutions may have occurred due to the inherent limitations of voice recognition software. Interpretation should be guided by context.      YOAN Germain  "

## 2024-01-10 NOTE — ASSESSMENT & PLAN NOTE
S/p left hemiarthroplasty 12/24/2023  Neurovascular status intact to left lower extremity  No signs of infection  Continue Tylenol as needed for pain  Continue PT/OT  Continue safety/fall precautions  Follow-up with orthopedics outpatient

## 2024-01-10 NOTE — PROGRESS NOTES
Facility: [facility]  POS: 31 (STR)  Progress Note    Chief Complaint/Reason for visit: STR follow up visit  Code status: DNR  History of Present Illness: 89-year-old female seen and examined for STR follow-up of acute and chronic medical conditions.  Received patient seated in chair and appeared in no distress.  No complaints of pain at time of exam.  Elevation in creatinine level yesterday 1.53 and previously 1.31 on 1/5.  Reviewed BMP trends back 3 years and it appears that patient's creatinine has been trending 1.3 to 1.6 with some isolated higher elevations likely due to acute illness.  Lasix and potassium were both discontinued yesterday and BMP scheduled for 1/12/2024.  Encouraged patient to drink fluids.  Patient was seen by [facility] wound CRNP yesterday for sacral wound, left lateral lower leg and right lateral lower leg wounds; notes reviewed. See A/P for additional information.  Past Medical History: unchanged from history and physical  Family History: unchanged from history and physical  Social History: unchanged from history and physical  Review of systems: As per review of medical illness, all other systems reviewed and negative.  Medications: All medication and routine orders were reviewed and updated  Allergies: Reviewed and unchanged  Consults reviewed:PT, OT, and Other body.  Repeat Peabody as a Peabody follow-up 8 9  Labs/Diagnostics (reviewed by this provider): Copy in Chart    Imaging Reviewed: None today  Physical Exam  Weight: 72.4 pounds Temp:           BP: 101/6297.8  pulse: Resp:       O2 Sat:  Constitutional: Frail and thin elderly female  Orientation:Person, Place, and Day     Physical Exam  Vitals and nursing note reviewed.   Constitutional:       General: She is not in acute distress.     Appearance: She is not toxic-appearing or diaphoretic.   HENT:      Head: Normocephalic.      Nose: No congestion.      Mouth/Throat:      Mouth: Mucous membranes are moist.      Pharynx: No  oropharyngeal exudate.   Eyes:      Extraocular Movements: Extraocular movements intact.      Conjunctiva/sclera: Conjunctivae normal.   Cardiovascular:      Rate and Rhythm: Normal rate.   Pulmonary:      Effort: Pulmonary effort is normal. No respiratory distress.      Comments: Shortness of breath noted on exertion which is not new per patient  Abdominal:      General: Bowel sounds are normal. There is no distension.      Palpations: Abdomen is soft.      Tenderness: There is no abdominal tenderness. There is no guarding.   Musculoskeletal:      Right lower leg: No edema.      Left lower leg: No edema.      Comments: Moves all 4 extremities.   Skin:     General: Skin is warm and dry.      Capillary Refill: Capillary refill takes less than 2 seconds.      Comments:  red perianal area.  Dressings intact to bilateral lower extremities and sacral area.   Neurological:      Mental Status: She is alert. Mental status is at baseline.      Motor: Weakness present.      Gait: Gait abnormal.   Psychiatric:         Mood and Affect: Mood normal.         Behavior: Behavior normal.         Thought Content: Thought content normal.       Assessment/Plan:  89-year-old female with:    Chronic diastolic heart failure (HCC)  Wt Readings from Last 3 Encounters:   01/02/24 33.6 kg (74 lb)   Patient appears euvolemic  No complaints of shortness of breath or chest pain  Lasix and potassium were discontinued yesterday due to increase in creatinine level from 1.31 to 1.53.  Reviewed 3-year BMP trends and it appears that patient's creatinine has been trending 1.3-1.6  Will monitor closely off of diuretic  Continue CHF pathway/daily weights    CKD (chronic kidney disease), stage IV ([facility])  Reviewed creatinine trends for the past 3 years  Creatinine 1.65 on 9/6/2020, creatinine 1.38 on 12/13/2022, 1.52 on 4/11/2023  Creatinine1.28 on 6/5/2023, 1.01 on 6/6/2023, 1.70 on 6/20/2023, 1.66 on 7/27/2023, 1.84 on 12/13/2023  Estimated creatinine  clearance of 28 mL/min as per Cockcroft-Gault formula  Avoid nephrotoxic medications such as NSAIDs  Avoid hypotension  Ensure adequate hydration    Closed fracture of left hip (HCC)  S/p left hemiarthroplasty 12/24/2023  Neurovascular status intact to left lower extremity  No signs of infection  Continue Tylenol as needed for pain  Continue PT/OT  Continue safety/fall precautions  Follow-up with orthopedics outpatient    Paroxysmal atrial fibrillation (HCC)  Heart rate controlled on Toprol XL 25 mg daily  Continue eliquis for anticoagulation/stroke prevention    Pressure injury of sacral region, stage 3 (HCC)  Sacral wound managed by wound team at   Continue local wound care with Myra shield as ordered by [facility] wound CRNP  Continue offloading  Continue Dangelo supplement for wound healing     This note was completed in part utilizing [employer] direct voice recognition software.  Grammatical errors, random word insertion, spelling mistakes, and incomplete sentences may be an occasional consequence of the system secondary to software limitations, ambient noise and hardware issues.  At the time of dictation, efforts were made to edit, clarify and/or correct errors.  Please read the chart carefully and recognize, using context, where substitutions have occurred.  If you have any questions or concerns about the context, text or information contained within the body of this dictation, please contact myself, the provider, for further clarification.    YOAN Wade  1/10/17695:20 PM

## 2024-01-10 NOTE — ASSESSMENT & PLAN NOTE
Left lower leg anterior  Covered with eschar, 0.3 depth, with no obvious sign of infection  Local wound care with Betadine  Continue to offload  Follow-up next week

## 2024-01-10 NOTE — ASSESSMENT & PLAN NOTE
Sacral wound  Full-thickness, with no obvious sign of infection  Local wound care with Myra shield  Continue to offload  Increase protein intake  Follow-up next week

## 2024-01-10 NOTE — ASSESSMENT & PLAN NOTE
Bilateral lower legs  The wound bed on the left lateral calf and right lateral calf is granulation, with no drainage, dry,  Local wound care with hydrogel  The wound bed on right anterior lower leg and right medial knee is covered with eschar, local wound care with Betadine  Continue to offload  Increase protein intake  Follow-up next week

## 2024-01-10 NOTE — ASSESSMENT & PLAN NOTE
Reviewed creatinine trends for the past 3 years  Creatinine 1.65 on 9/6/2020, creatinine 1.38 on 12/13/2022, 1.52 on 4/11/2023  Creatinine1.28 on 6/5/2023, 1.01 on 6/6/2023, 1.70 on 6/20/2023, 1.66 on 7/27/2023, 1.84 on 12/13/2023  Estimated creatinine clearance of 28 mL/min as per Cockcroft-Gault formula  Avoid nephrotoxic medications such as NSAIDs  Avoid hypotension  Ensure adequate hydration

## 2024-01-10 NOTE — ASSESSMENT & PLAN NOTE
Wt Readings from Last 3 Encounters:   01/02/24 33.6 kg (74 lb)   Patient appears euvolemic  No complaints of shortness of breath or chest pain  Lasix and potassium were discontinued yesterday due to increase in creatinine level from 1.31-1.53.  Reviewed 3-year BMP trends and it appears that patient's creatinine has been trending 1.3-1.6.   Will monitor closely off of diuretic  Continue CHF pathway/daily weights

## 2024-01-10 NOTE — ASSESSMENT & PLAN NOTE
Heart rate controlled on Toprol XL 25 mg daily  Continue eliquis for anticoagulation/stroke prevention

## 2024-01-12 ENCOUNTER — NURSING HOME VISIT (OUTPATIENT)
Dept: GERIATRICS | Facility: OTHER | Age: 89
End: 2024-01-12
Payer: COMMERCIAL

## 2024-01-12 DIAGNOSIS — S72.002D CLOSED FRACTURE OF LEFT HIP WITH ROUTINE HEALING, SUBSEQUENT ENCOUNTER: ICD-10-CM

## 2024-01-12 DIAGNOSIS — R53.81 DEBILITY: ICD-10-CM

## 2024-01-12 DIAGNOSIS — I48.0 PAROXYSMAL ATRIAL FIBRILLATION (HCC): ICD-10-CM

## 2024-01-12 DIAGNOSIS — I50.32 CHRONIC DIASTOLIC HEART FAILURE (HCC): Primary | ICD-10-CM

## 2024-01-12 DIAGNOSIS — Z51.5 PALLIATIVE CARE PATIENT: ICD-10-CM

## 2024-01-12 DIAGNOSIS — N18.4 CKD (CHRONIC KIDNEY DISEASE), STAGE IV (HCC): ICD-10-CM

## 2024-01-12 PROCEDURE — 99309 SBSQ NF CARE MODERATE MDM 30: CPT | Performed by: NURSE PRACTITIONER

## 2024-01-13 NOTE — ASSESSMENT & PLAN NOTE
Heart rate stable  Continue Toprol XL 25 mg daily  Continue Eliquis for anticoagulation/stroke prevention

## 2024-01-13 NOTE — PROGRESS NOTES
Facility: [facility]  POS: 31 (STR)  Progress Note    Chief Complaint/Reason for visit: STR follow up visit  Code status: DNR  History of Present Illness: 89-year-old female seen and examined for STR follow-up of acute and chronic medical conditions.  Received patient seated in chair and appeared in no distress.  Patient gained 3 pounds and reports having increased shortness of breath with exertion.  Patient reports that she disliked the nutritional supplements and refusing.  [facility] for management of stage III sacral ulcer and bilateral lower extremity wounds on 1/9/2024; notes reviewed.  Past Medical History: unchanged from history and physical  Family History: unchanged from history and physical  Social History: unchanged from history and physical  Review of systems: As per review of medical illness, all other systems reviewed and negative.  Medications: All medication and routine orders were reviewed and updated  Allergies: Reviewed and unchanged  Consults reviewed:PT, OT, and Other  Pertinent Labs/Diagnostics (reviewed by this provider): Copy in Chart  BMP  Imaging Reviewed: None today  Physical Exam  Weight: 75.4 pounds and previously 72.2 pounds Temp: 97.6          BP: 98/61  pulse: 84 resp: 18  Constitutional: Cachetic  Orientation:Person, Place, and Day     Physical Exam  Vitals and nursing note reviewed.   Constitutional:       General: She is not in acute distress.     Appearance: She is ill-appearing. She is not toxic-appearing or diaphoretic.      Comments: Frail and thin female who appears with chronic illness.   HENT:      Head: Normocephalic.      Nose: No congestion.      Mouth/Throat:      Mouth: Mucous membranes are moist.      Pharynx: No oropharyngeal exudate.   Cardiovascular:      Rate and Rhythm: Normal rate.   Pulmonary:      Effort: Pulmonary effort is normal. No respiratory distress.      Comments: With conversational dyspnea noted and also dyspneic  on exertion.  Fine crackles bibasilar with decreased breath sounds.  Abdominal:      General: Bowel sounds are normal. There is no distension.      Palpations: Abdomen is soft.      Tenderness: There is no abdominal tenderness. There is no guarding.   Musculoskeletal:      Right lower leg: Edema (Mild ankle edema) present.      Left lower leg: Edema (Mild ankle edema) present.      Comments: Moves all 4 extremities.   Skin:     General: Skin is warm and dry.      Capillary Refill: Capillary refill takes less than 2 seconds.      Comments: Left hip surgical wound healing.  No signs of infection.   Neurological:      Mental Status: She is alert. Mental status is at baseline.      Motor: Weakness present.      Gait: Gait abnormal.   Psychiatric:         Mood and Affect: Mood normal.         Behavior: Behavior normal.         Thought Content: Thought content normal.       Assessment/Plan:  89-year-old female with:    Chronic diastolic heart failure (HCC)  Wt Readings from Last 3 Encounters:   01/02/24 33.6 kg (74 lb)   Patient gained 3 pounds, with mild ankle edema and crackles bibasilar noted.  Normally short of breath on exertion at baseline but patient reports increased in SOB with exertion  Will order torsemide 20 mg x 1 dose today then start 10 mg daily tomorrow  With soft BPs; may need to start midodrine to maintain blood pressures in order for diuresis  Potassium level 4.4 today  Continue to monitor weights daily    Closed fracture of left hip ([facility])  S/p left hemiarthroplasty 12/24/2023  Neurovascular status intact to left lower extremity  No complaints of pain at time of exam  Continue Tylenol as needed for pain  Continue PT/OT  Follow-up with orthopedics outpatient    CKD (chronic kidney disease), stage IV ([facility])  Creatinine 1.39 today  At risk for worsening kidney failure in setting of diuretic requirement for CHF  Avoid nephrotoxic medications  Renal dose medications  Ensure adequate hydration    Paroxysmal  atrial fibrillation (HCC)  Heart rate stable  Continue Toprol XL 25 mg daily  Continue Eliquis for anticoagulation/stroke prevention    Palliative care patient  Follows [facility] palliative service    Debility  Multifactorial  Continue supportive care at SNF  Continue PT/OT  Continue safety/fall precautions  Ensure adequate hydration and nutrition     This note was completed in part utilizing [employer] direct voice recognition software.  Grammatical errors, random word insertion, spelling mistakes, and incomplete sentences may be an occasional consequence of the system secondary to software limitations, ambient noise and hardware issues.  At the time of dictation, efforts were made to edit, clarify and/or correct errors.  Please read the chart carefully and recognize, using context, where substitutions have occurred.  If you have any questions or concerns about the context, text or information contained within the body of this dictation, please contact myself, the provider, for further clarification.    YOAN Wade  1/12/20248:46 PM

## 2024-01-13 NOTE — ASSESSMENT & PLAN NOTE
Wt Readings from Last 3 Encounters:   01/02/24 33.6 kg (74 lb)   Patient gained 3 pounds, with mild ankle edema and crackles bibasilar noted.  Normally short of breath on exertion at baseline but patient reports increased in SOB with exertion  Will order torsemide 20 mg x 1 dose today then start 10 mg daily tomorrow  With soft BPs; may need to start midodrine to maintain blood pressures in order for diuresis  Potassium level 4.4 today  Continue to monitor weights daily

## 2024-01-13 NOTE — ASSESSMENT & PLAN NOTE
Creatinine 1.39 today  At risk for worsening kidney failure in setting of diuretic requirement for CHF  Avoid nephrotoxic medications  Renal dose medications  Ensure adequate hydration

## 2024-01-15 ENCOUNTER — NURSING HOME VISIT (OUTPATIENT)
Dept: GERIATRICS | Facility: OTHER | Age: 89
End: 2024-01-15
Payer: COMMERCIAL

## 2024-01-15 DIAGNOSIS — T14.8XXA TRAUMATIC INJURY TO SKIN OR SUBCUTANEOUS TISSUE: ICD-10-CM

## 2024-01-15 DIAGNOSIS — I50.32 CHRONIC DIASTOLIC HEART FAILURE (HCC): Primary | ICD-10-CM

## 2024-01-15 DIAGNOSIS — S72.002D CLOSED FRACTURE OF LEFT HIP WITH ROUTINE HEALING, SUBSEQUENT ENCOUNTER: ICD-10-CM

## 2024-01-15 DIAGNOSIS — I48.0 PAROXYSMAL ATRIAL FIBRILLATION (HCC): ICD-10-CM

## 2024-01-15 DIAGNOSIS — L89.153 PRESSURE INJURY OF SACRAL REGION, STAGE 3 (HCC): ICD-10-CM

## 2024-01-15 PROCEDURE — 99309 SBSQ NF CARE MODERATE MDM 30: CPT | Performed by: NURSE PRACTITIONER

## 2024-01-15 NOTE — ASSESSMENT & PLAN NOTE
Sacral wound managed by wound team at [facility]  Continue local wound care with Myra shield as ordered by [facility] wound CRNP  Continue offloading  Encourage patient to drink supplements to help with healing

## 2024-01-15 NOTE — ASSESSMENT & PLAN NOTE
Bilateral lower legs with dressings intact  Managed by wound team at [facility]  Continue local wound care as ordered  Protein was increased by wound CRNP, however patient is refusing even with much encouragement

## 2024-01-15 NOTE — PROGRESS NOTES
Facility: [facility]  POS: 31 (STR)  Progress Note    Chief Complaint/Reason for visit: STR follow up visit  Code status: DNR  History of Present Illness: 89-year-old female seen and examined for STR follow-up of acute and chronic medical conditions.  Received patient seated in chair eating breakfast and appeared in no distress.  Patient is a max assist with all ADLs per nursing staff.  Respiratory status stable on room air.  Her weights have been stable the past 3 days.  Patient is participating in therapy.  See A/P for additional information.  Past Medical History: unchanged from history and physical  Family History: unchanged from history and physical  Social History: unchanged from history and physical  Review of systems: As per review of medical illness, all other systems reviewed and negative.  Medications: All medication and routine orders were reviewed and updated  Allergies: Reviewed and unchanged  Consults reviewed:PT, OT, and Other  Pertinent Labs/Diagnostics (reviewed by this provider): Copy in Chart paper chart  Imaging Reviewed: None today  Physical Exam  Weight: 70.8 pounds   Temp: 98          BP: 100/56  pulse: 88 resp: 18  Constitutional: Cachetic  Orientation:Person and Place     Physical Exam  Vitals and nursing note reviewed.   Constitutional:       General: She is not in acute distress.     Appearance: She is ill-appearing. She is not toxic-appearing or diaphoretic.      Comments: Cachectic appearance   HENT:      Nose: No congestion.      Mouth/Throat:      Pharynx: No oropharyngeal exudate.   Eyes:      Extraocular Movements: Extraocular movements intact.      Conjunctiva/sclera: Conjunctivae normal.   Cardiovascular:      Rate and Rhythm: Normal rate.   Pulmonary:      Effort: Pulmonary effort is normal. No respiratory distress.      Comments: No conversational dyspnea noted today.  Decreased breath sounds bibasilar.  Abdominal:      General: Bowel sounds are normal. There is no  distension.      Palpations: Abdomen is soft.      Tenderness: There is no abdominal tenderness. There is no guarding.   Musculoskeletal:      Right lower leg: No edema.      Left lower leg: No edema.      Comments: Moves all 4 extremities.   Skin:     General: Skin is warm and dry.      Capillary Refill: Capillary refill takes less than 2 seconds.   Neurological:      Mental Status: She is alert. Mental status is at baseline.   Psychiatric:         Mood and Affect: Mood normal.         Behavior: Behavior normal.         Thought Content: Thought content normal.       Assessment/Plan:  89-year-old female with:    Chronic diastolic heart failure (HCC)  Wt Readings from Last 3 Encounters:   01/02/24 33.6 kg (74 lb)   Patient's weight stable past 3 days  Currently on torsemide 10 mg daily  SBPs 100s-110s the past two days  Continue CHF pathway/daily weights  We will continue to monitor closely    Paroxysmal atrial fibrillation (HCC)  Heart rate stable on Toprol-XL 25 mg daily  Continue Eliquis for anticoagulation/stroke prevention    Closed fracture of left hip (HCC)  S/p left hemiarthroplasty 12/24/2023  Neurovascular status intact to left lower extremity  No complaints of pain at time of exam  Continue Tylenol as needed for pain  Continue PT/OT  Continue safety/fall precautions  Follow-up with orthopedics outpatient    Traumatic injury to skin or subcutaneous tissue  Bilateral lower legs with dressings intact  Managed by wound team at [facility]  Continue local wound care as ordered  Protein was increased by wound CRNP, however patient is refusing even with much encouragement    Pressure injury of sacral region, stage 3 (HCC)  Sacral wound managed by wound team at [facility]  Continue local wound care with Myra shield as ordered by [facility] wound CRNP  Continue offloading  Encourage patient to drink supplements to help with healing     This note was completed in part utilizing [employer] direct  voice recognition software.  Grammatical errors, random word insertion, spelling mistakes, and incomplete sentences may be an occasional consequence of the system secondary to software limitations, ambient noise and hardware issues.  At the time of dictation, efforts were made to edit, clarify and/or correct errors.  Please read the chart carefully and recognize, using context, where substitutions have occurred.  If you have any questions or concerns about the context, text or information contained within the body of this dictation, please contact myself, the provider, for further clarification.    YOAN Wade  1/15/55465:39 PM

## 2024-01-15 NOTE — ASSESSMENT & PLAN NOTE
Heart rate stable on Toprol-XL 25 mg daily  Continue Eliquis for anticoagulation/stroke prevention

## 2024-01-15 NOTE — ASSESSMENT & PLAN NOTE
Wt Readings from Last 3 Encounters:   01/02/24 33.6 kg (74 lb)   Patient's weight stable past 3 days  Currently on torsemide 10 mg daily  SBPs 100s-110s the past two days  Continue CHF pathway/daily weights  We will continue to monitor closely

## 2024-01-15 NOTE — ASSESSMENT & PLAN NOTE
S/p left hemiarthroplasty 12/24/2023  Neurovascular status intact to left lower extremity  No complaints of pain at time of exam  Continue Tylenol as needed for pain  Continue PT/OT  Continue safety/fall precautions  Follow-up with orthopedics outpatient

## 2024-01-17 ENCOUNTER — NURSING HOME VISIT (OUTPATIENT)
Dept: WOUND CARE | Facility: HOSPITAL | Age: 89
End: 2024-01-17
Payer: COMMERCIAL

## 2024-01-17 DIAGNOSIS — T14.8XXA TRAUMATIC INJURY TO SKIN OR SUBCUTANEOUS TISSUE: ICD-10-CM

## 2024-01-17 DIAGNOSIS — T14.8XXA DEGLOVING INJURY: ICD-10-CM

## 2024-01-17 DIAGNOSIS — R26.2 AMBULATORY DYSFUNCTION: ICD-10-CM

## 2024-01-17 DIAGNOSIS — L89.150 PRESSURE INJURY OF SACRAL REGION, UNSTAGEABLE (HCC): Primary | ICD-10-CM

## 2024-01-17 PROCEDURE — 99308 SBSQ NF CARE LOW MDM 20: CPT | Performed by: NURSE PRACTITIONER

## 2024-01-17 NOTE — LETTER
Patient:  Amanda Orr   5/7/1926           YOAN Germain saw Amanda Orr for a wound care visit on 1/17/2024. See below for information relating to this visit.      Chief Complaint   Patient presents with    Follow Up Wound Care Visit        Assessment/Plan:  1. Pressure injury of sacral region, unstageable (HCC)  Assessment & Plan:  Sacral wound  Covered with slough, with no obvious sign of infection  Local wound care with Myra shield - continue  Continue to offload  Order pressure relief cushion for the recliner chair. Patient currently have air mattress.   Increase protein intake. On ice cream, fortified apple sauce, and manohar  Follow-up next week        2. Ambulatory dysfunction    3. Degloving injury  Assessment & Plan:  Left lower leg anterior  Covered with eschar, stable  Local wound care with Betadine  Continue to offload  Follow-up next week      4. Traumatic injury to skin or subcutaneous tissue  Assessment & Plan:  Bilateral lower legs  All wounds improved  Change local wound care to left lateral calf and right lateral calf with Xeroform  The wound bed on right anterior lower leg and right medial knee is covered with eschar, local wound care with Betadine  Continue to offload  Increase protein intake  Follow-up next week               Orders:  Amanda Orr  5/7/1926  Wound:  Sacrum  Discontinue previous wound order  Cleanse the wound bed with NSS   Apply non-sting skin prep to periwound area  Apply dynashield to wound bed, then cover with mepilex   Frequency : daily and prn for soiling    Wound : Left lateral lower leg and right lateral lower leg  Cleanse with normal saline solution or wound cleanser  Apply Skin-Prep to periwound area  Apply xerofoam to wound bed and cover with ABD and rolled gauze  Daily and as needed for soiling    Wound: Left lower leg anterior, right lower leg anterior, right medial knee  Cleanse with normal saline solution or wound cleanser  Apply Betadine to  wound bed  Cover with rolled gauze  Daily and as needed for soiling    Pressure relief cushion for the recliner chair    Offload all wounds  Turn and reposition frequently,   Increase protein intake.  Monitor for any sign of infection or worsening, inform PCP or patient's primary physician in your facility.      Follow Up:  Return in about 1 week (around 1/24/2024).       [facility] hours are 8:00 am - 4:30 pm Monday through Friday. The center phone number is [phone]. You can also contact me directly thru my email at [email or web address] or thru tiger text. If it is an emergency, please contact the PCP or patient's attending physician in your facility.     Sincerely,    Electronically signed by YOAN Germain    Patient : Amanda Orr    5/7/1926

## 2024-01-18 NOTE — PROGRESS NOTES
[facility] WOUND CARE MANAGEMENT   AND HYPERBARIC MEDICINE CENTER       Patient ID: Amanda Orr is a 89 y.o. female Date of Birth 5/7/1926     Location of Service: [facility]    Performed wound round with: Wound team     Chief Complaint : Multiple wounds    Wound Instructions:  Wound:  Sacrum  Discontinue previous wound order  Cleanse the wound bed with NSS   Apply non-sting skin prep to periwound area  Apply dynashield to wound bed, then cover with mepilex   Frequency : daily and prn for soiling    Wound : Left lateral lower leg and right lateral lower leg  Cleanse with normal saline solution or wound cleanser  Apply Skin-Prep to periwound area  Apply xerofoam to wound bed and cover with ABD and rolled gauze  Daily and as needed for soiling    Wound: Left lower leg anterior, right lower leg anterior, right medial knee  Cleanse with normal saline solution or wound cleanser  Apply Betadine to wound bed  Cover with rolled gauze  Daily and as needed for soiling    Pressure relief cushion for the recliner chair    Offload all wounds  Turn and reposition frequently,   Increase protein intake.  Monitor for any sign of infection or worsening, inform PCP or patient's primary physician in your facility.      Allergies  Patient has no allergy information on record.      Assessment & Plan:  1. Pressure injury of sacral region, unstageable (HCC)  Assessment & Plan:  Sacral wound  Covered with slough, with no obvious sign of infection  Local wound care with Myra shield - continue  Continue to offload  Order pressure relief cushion for the recliner chair. Patient currently have air mattress.   Increase protein intake. On ice cream, fortified apple sauce, and manohar  Follow-up next week        2. Ambulatory dysfunction    3. Degloving injury  Assessment & Plan:  Left lower leg anterior  Covered with eschar, stable  Local wound care with Betadine  Continue to offload  Follow-up next week      4. Traumatic injury to skin  or subcutaneous tissue  Assessment & Plan:  Bilateral lower legs  All wounds improved  Change local wound care to left lateral calf and right lateral calf with Xeroform  The wound bed on right anterior lower leg and right medial knee is covered with eschar, local wound care with Betadine  Continue to offload  Increase protein intake  Follow-up next week                   Subjective:   1/9/2024This is a 89 y.o., female referred to our service for wound/ skin alterations on bilateral lower legs and sacrum.Patient have a complex medical history including but not limited to closed fracture of left hip and hypokalemia. Patient was referred by Senior Care Team. Patient was seen in collaboration with the facility wound team.     Wound History: Patient is a poor historian and was not able to provide information related to the wound.  It is not clear where the wound on the bilateral lower legs came from.  Patient previously under the care of hospice.  The wound on the sacrum is pressure ulcer.    Received patient on recliner chair, seems comfortable.  Patient needs assistance with turning and repositioning in bed.  Patient is incontinent of both bowel and bladder.    1/17/2024 Follow up for multiple wounds . Received patient, not in distress. Facility staff did not report any significant issues related to the wound. Denies pain.               Review of Systems   Constitutional: Negative.    Respiratory: Negative.     Cardiovascular: Negative.    Musculoskeletal:  Positive for gait problem.   Skin:  Positive for wound.   Psychiatric/Behavioral: Negative.         Objective:    Physical Exam  Constitutional:       Comments: Seems frail   Cardiovascular:      Rate and Rhythm: Normal rate.   Pulmonary:      Effort: Pulmonary effort is normal.   Musculoskeletal:      Comments: LROM   Skin:     Findings: Lesion present.      Comments: Left lower leg anterior: Wound size is 6 x 1 x 0.3 cm.,  100% eschar, no drainage, periwound  normal, with no obvious sign of infection    Left lateral calf: Wound size is 2 x 1 x 0.1 cm.,  100% epithelial, no drainage, periwound normal, no obvious sign of infection    Right lower leg anterior: Wound size is 2 x 1 cm,  100% eschar, no drainage, no obvious sign of infection    Right lateral calf: Wound size is 4.5 x 2.7 x 0.1 cm.,  100% epithelial,  periwound is normal, with no obvious sign of infection, no drainage     Sacrum: Wound size is 1 x 2.5 x 0.1 cm, 100% slough, small amount of serous drainage, periwound is normal, with no obvious sign of infection                  Procedures           Patient's care was coordinated with nursing facility staff. Recent vitals, labs and updated medications were reviewed on EMR or chart system of facility. Past Medical, surgical, social, medication and allergy history and patient's previous records were reviewed and updated as appropriate: Most up-to date information is available in the facility EMR where the patient is currently admitted.    Patient Active Problem List   Diagnosis    Chronic diastolic heart failure ([facility])    Palliative care patient    Paroxysmal atrial fibrillation ([facility])    Primary hypertension    Stage 3a chronic kidney disease (CKD) ([facility])    Closed fracture of left hip ([facility])    Pressure injury of sacral region, unstageable ([facility])    Hypokalemia    CKD (chronic kidney disease), stage IV ([facility])    Debility    Acquired hypothyroidism    Degloving injury    Ambulatory dysfunction    Traumatic injury to skin or subcutaneous tissue     History reviewed. No pertinent past medical history.  History reviewed. No pertinent surgical history.  Social History     Socioeconomic History    Marital status: Single     Spouse name: None    Number of children: None    Years of education: None    Highest education level: None   Occupational History    None   Tobacco Use    Smoking status: Former     Types: Cigarettes    Smokeless tobacco: Never   Substance and Sexual  Activity    Alcohol use: None    Drug use: None    Sexual activity: None   Other Topics Concern    None   Social History Narrative    None     Social Determinants of Health     Financial Resource Strain: Low Risk  (6/5/2023)    Received from [facility]    Overall Financial Resource Strain (CARDIA)     Difficulty of Paying Living Expenses: Not hard at all   Food Insecurity: No Food Insecurity (6/5/2023)    Received from [facility]    Hunger Vital Sign     Worried About Running Out of Food in the Last Year: Never true     Ran Out of Food in the Last Year: Never true   Transportation Needs: No Transportation Needs (6/5/2023)    Received from [facility]    PRAPARE - Transportation     Lack of Transportation (Medical): No     Lack of Transportation (Non-Medical): No   Physical Activity: Not on file   Stress: Not on file   Social Connections: Not on file   Intimate Partner Violence: Not At Risk (6/5/2023)    Received from [facility]    Humiliation, Afraid, Rape, and Kick questionnaire     Fear of Current or Ex-Partner: No     Emotionally Abused: No     Physically Abused: No     Sexually Abused: No   Housing Stability: Low Risk  (6/5/2023)    Received from [facility]    Housing Stability Vital Sign     Unable to Pay for Housing in the Last Year: No     Number of Places Lived in the Last Year: 1     Unstable Housing in the Last Year: No        Current Outpatient Medications:     apixaban (Eliquis) 2.5 mg, Take 2.5 mg by mouth 2 (two) times a day, Disp: , Rfl:     atorvastatin (LIPITOR) 20 mg tablet, Take 20 mg by mouth daily, Disp: , Rfl:     calcium carbonate-vitamin D 250 mg-3.125 mcg per tablet, Take 1 tablet by mouth 2 (two) times a day, Disp: , Rfl:     levothyroxine (Synthroid) 88 mcg tablet, Take 88 mcg by mouth daily, Disp: , Rfl:     Melatonin 10-10 MG TBCR, Take 10 mg by mouth daily at bedtime, Disp: , Rfl:     metoprolol  "succinate (TOPROL-XL) 25 mg 24 hr tablet, Take 25 mg by mouth daily, Disp: , Rfl:     torsemide (DEMADEX) 20 mg tablet, Take 20 mg by mouth daily, Disp: , Rfl:   History reviewed. No pertinent family history.           Coordination of Care: Wound team aware of the treatment plan. Facility nurse will provide wound treatment and monitor the wound for any changes.     Patient / Staff education : Patient / Staff was given education on sign of infection and pressure ulcer prevention. Patient/ Staff verbalized understanding     Follow up :  Next week    Voice-recognition software may have been used in the preparation of this document. Occasional wrong word or \"sound-alike\" substitutions may have occurred due to the inherent limitations of voice recognition software. Interpretation should be guided by context.      YOAN Germain  "

## 2024-01-18 NOTE — ASSESSMENT & PLAN NOTE
Sacral wound  Covered with slough, with no obvious sign of infection  Local wound care with Myra shield - continue  Continue to offload  Order pressure relief cushion for the recliner chair. Patient currently have air mattress.   Increase protein intake. On ice cream, fortified apple sauce, and manohar  Follow-up next week

## 2024-01-18 NOTE — ASSESSMENT & PLAN NOTE
Bilateral lower legs  All wounds improved  Change local wound care to left lateral calf and right lateral calf with Xeroform  The wound bed on right anterior lower leg and right medial knee is covered with eschar, local wound care with Betadine  Continue to offload  Increase protein intake  Follow-up next week

## 2024-01-18 NOTE — ASSESSMENT & PLAN NOTE
Left lower leg anterior  Covered with eschar, stable  Local wound care with Betadine  Continue to offload  Follow-up next week

## 2024-01-22 ENCOUNTER — TELEPHONE (OUTPATIENT)
Age: 89
End: 2024-01-22

## 2024-01-22 ENCOUNTER — NURSING HOME VISIT (OUTPATIENT)
Dept: GERIATRICS | Facility: OTHER | Age: 89
End: 2024-01-22
Payer: COMMERCIAL

## 2024-01-22 DIAGNOSIS — R26.2 AMBULATORY DYSFUNCTION: ICD-10-CM

## 2024-01-22 DIAGNOSIS — I50.32 CHRONIC DIASTOLIC HEART FAILURE (HCC): Primary | ICD-10-CM

## 2024-01-22 DIAGNOSIS — E03.9 ACQUIRED HYPOTHYROIDISM: ICD-10-CM

## 2024-01-22 DIAGNOSIS — N18.4 CKD (CHRONIC KIDNEY DISEASE), STAGE IV (HCC): ICD-10-CM

## 2024-01-22 DIAGNOSIS — E44.0 MODERATE PROTEIN-CALORIE MALNUTRITION (HCC): ICD-10-CM

## 2024-01-22 DIAGNOSIS — I48.0 PAROXYSMAL ATRIAL FIBRILLATION (HCC): ICD-10-CM

## 2024-01-22 DIAGNOSIS — S72.002D CLOSED FRACTURE OF LEFT HIP WITH ROUTINE HEALING, SUBSEQUENT ENCOUNTER: ICD-10-CM

## 2024-01-22 PROCEDURE — 99309 SBSQ NF CARE MODERATE MDM 30: CPT | Performed by: NURSE PRACTITIONER

## 2024-01-22 RX ORDER — TORSEMIDE 10 MG/1
10 TABLET ORAL DAILY
COMMUNITY

## 2024-01-22 NOTE — TELEPHONE ENCOUNTER
Caller: tung noonan    Doctor: n/a    Reason for call: [facility]    Call back#: N/A

## 2024-01-23 ENCOUNTER — NURSING HOME VISIT (OUTPATIENT)
Dept: WOUND CARE | Facility: HOSPITAL | Age: 89
End: 2024-01-23
Payer: COMMERCIAL

## 2024-01-23 DIAGNOSIS — L89.153 PRESSURE INJURY OF SACRAL REGION, STAGE 3 (HCC): Primary | ICD-10-CM

## 2024-01-23 DIAGNOSIS — T14.8XXA TRAUMATIC INJURY TO SKIN OR SUBCUTANEOUS TISSUE: ICD-10-CM

## 2024-01-23 DIAGNOSIS — R26.2 AMBULATORY DYSFUNCTION: ICD-10-CM

## 2024-01-23 DIAGNOSIS — T14.8XXA DEGLOVING INJURY: ICD-10-CM

## 2024-01-23 PROCEDURE — 99309 SBSQ NF CARE MODERATE MDM 30: CPT | Performed by: NURSE PRACTITIONER

## 2024-01-23 NOTE — PROGRESS NOTES
Facility: [facility]  POS: 31 (STR)  Progress Note    Chief Complaint/Reason for visit: STR follow-up visit  Code status: DNR  History of Present Illness: 89-year-old female seen and examined for STR follow-up of acute and chronic medical conditions.  Received patient seated in chair and appeared in no distress.  Patient reports that she is eating better.  Continues to participate in therapy.  No acute issues reported by nursing staff.  See A/P for additional information.  Past Medical History: unchanged from history and physical  Family History: Unchanged from history and physical  Social History: Unchanged from history and physical  Review of systems: As per review of medical illness, all other systems reviewed and negative.  Medications: All medication and routine orders were reviewed and updated  Allergies: Reviewed and unchanged  Consults reviewed:PT, OT, Speech, and Other  Labs/Diagnostics (reviewed by this provider): Copy in Chart  Imaging Reviewed: None today  Physical Exam  Weight: 70 pounds Temp: 98          BP: 100/60  pulse: 72 resp: 16       Orientation:Person, Place, and Day     Physical Exam  Vitals and nursing note reviewed.   Constitutional:       General: She is not in acute distress.     Appearance: She is not toxic-appearing or diaphoretic.      Comments: Frail cachectic appearing elderly female.  In no distress.   HENT:      Head: Normocephalic.      Nose: No congestion.      Mouth/Throat:      Mouth: Mucous membranes are moist.   Cardiovascular:      Rate and Rhythm: Normal rate.   Pulmonary:      Effort: Pulmonary effort is normal. No respiratory distress.   Abdominal:      General: Bowel sounds are normal. There is no distension.      Palpations: Abdomen is soft.      Tenderness: There is no abdominal tenderness. There is no guarding.   Musculoskeletal:      Cervical back: Neck supple. No rigidity.      Right lower leg: No edema.      Left lower leg: No edema.      Comments: Moves all 4  extremities.   Skin:     General: Skin is warm and dry.      Capillary Refill: Capillary refill takes less than 2 seconds.      Comments: Patient with wounds bilateral lower extremities covered with dressings.  Pressure injury of sacral region.   Neurological:      Mental Status: She is alert. Mental status is at baseline.      Motor: Weakness present.      Gait: Gait abnormal.   Psychiatric:         Mood and Affect: Mood normal.         Behavior: Behavior normal.         Thought Content: Thought content normal.       Assessment/Plan:  89-year-old female with:    Chronic diastolic heart failure (HCC)  Wt Readings from Last 3 Encounters:   01/02/24 33.6 kg (74 lb)   Patient's weights trending 70 to 71 pounds  No signs or symptoms of fluid overload  Continue torsemide 10 mg daily  Continue CHF pathway/daily weights    Closed fracture of left hip (HCC)  S/p hemiarthroplasty 12/24/2023  Neurovascular status intact to left lower extremity  WBAT LLE  Patient ambulated 12 feet with FWW moderate assist  Continue Tylenol as needed for pain  Continue PT/OT  Continue safety/fall precautions    CKD (chronic kidney disease), stage IV (HCC)  Patient is at risk for worsening kidney failure in setting of diuretic requirement for CHF  Avoid nephrotoxic medications such as NSAIDs  Renal dose medications  Avoid hypotension    Acquired hypothyroidism  Continue levothyroxine 88 mcg daily    Moderate protein-calorie malnutrition (HCC)  As evidenced by weight loss, loss of muscle, and subcutaneous tissue   Continue Dangelo nutritional supplement twice daily  Continue regular diet  Monitor weights    Paroxysmal atrial fibrillation (HCC)  Heart rate stable  Continue Toprol XL 25 mg daily  Continue Eliquis for anticoagulation/stroke prevention    Ambulatory dysfunction  Multifactorial  Continue supportive care  Continue PT/OT  Continue safety/fall precautions     This note was completed in part utilizing [employer] direct voice  recognition software.  Grammatical errors, random word insertion, spelling mistakes, and incomplete sentences may be an occasional consequence of the system secondary to software limitations, ambient noise and hardware issues.  At the time of dictation, efforts were made to edit, clarify and/or correct errors.  Please read the chart carefully and recognize, using context, where substitutions have occurred.  If you have any questions or concerns about the context, text or information contained within the body of this dictation, please contact myself, the provider, for further clarification.    YOAN Wade  1/22/20247:19 PM

## 2024-01-23 NOTE — ASSESSMENT & PLAN NOTE
S/p hemiarthroplasty 12/24/2023  Neurovascular status intact to left lower extremity  WBAT LLE  Patient ambulated 12 feet with FWW moderate assist  Continue Tylenol as needed for pain  Continue PT/OT  Continue safety/fall precautions

## 2024-01-23 NOTE — LETTER
Patient:  Amanda Orr   5/7/1926           YOAN Germain saw Amanda Orr for a wound care visit on 1/23/2024. See below for information relating to this visit.      Chief Complaint   Patient presents with    Follow Up Wound Care Visit        Assessment/Plan:  1. Pressure injury of sacral region, stage 3 (HCC)  Assessment & Plan:  Sacrum  Wound improved, increasing granulation  Local wound care with Triad paste  Continue to offload  Increase protein intake  Follow-up next week      2. Ambulatory dysfunction  Assessment & Plan:  On 24/7 restorative care      3. Degloving injury  Assessment & Plan:  Left lower leg anterior  Stable, 100% eschar, periwound is red due to the Xeroform dressing.  Education provided to staff not to apply Xeroform on the patient's wound.  Local wound care with Betadine  Continue to offload  Follow-up next week      4. Traumatic injury to skin or subcutaneous tissue  Assessment & Plan:  Bilateral lower legs  All wounds are stable  Change local wound care to left lateral calf and right lateral calf with bordered foam  The wound bed on right anterior lower leg and right medial knee is covered with eschar, local wound care with Betadine  Continue to offload  Increase protein intake  Follow-up next week               Orders:  Amanda Orr  5/7/1926  Wound:  Sacrum  Discontinue previous wound order  Cleanse the wound bed with NSS   Apply non-sting skin prep to periwound area  Apply triad paste to wound bed, then cover with ABD pad  Frequency : daily and prn for soiling    Wound : Left lateral lower leg and right lateral lower leg  Cleanse with normal saline solution or wound cleanser  Apply Skin-Prep to periwound area  Apply bordered foam to wound bed   Three times a week and as needed for soiling    Wound: Left lower leg anterior, right lower leg anterior, right medial knee  Cleanse with normal saline solution or wound cleanser  Apply Betadine to wound bed, GRETA  Daily and as  needed for soiling    Pressure relief cushion for the recliner chair    Offload all wounds  Turn and reposition frequently,   Increase protein intake.  Monitor for any sign of infection or worsening, inform PCP or patient's primary physician in your facility.      Follow Up:  Return in about 1 week (around 1/30/2024).       [facility] hours are 8:00 am - 4:30 pm Monday through Friday. The center phone number is [phone]. You can also contact me directly thru my email at [email or web address] or thru tiger text. If it is an emergency, please contact the PCP or patient's attending physician in your facility.     Sincerely,    Electronically signed by YOAN Germain    Patient : Amanda Orr    5/7/1926

## 2024-01-23 NOTE — ASSESSMENT & PLAN NOTE
Patient is at risk for worsening kidney failure in setting of diuretic requirement for CHF  Avoid nephrotoxic medications such as NSAIDs  Renal dose medications  Avoid hypotension

## 2024-01-23 NOTE — ASSESSMENT & PLAN NOTE
As evidenced by weight loss, loss of muscle, and subcutaneous tissue   Continue Dangelo nutritional supplement twice daily  Continue regular diet  Monitor weights

## 2024-01-23 NOTE — ASSESSMENT & PLAN NOTE
Wt Readings from Last 3 Encounters:   01/02/24 33.6 kg (74 lb)   Patient's weights trending 70 to 71 pounds  No signs or symptoms of fluid overload  Continue torsemide 10 mg daily  Continue CHF pathway/daily weights

## 2024-01-24 NOTE — ASSESSMENT & PLAN NOTE
Left lower leg anterior  Stable, 100% eschar, periwound is red due to the Xeroform dressing.  Education provided to staff not to apply Xeroform on the patient's wound.  Local wound care with Betadine  Continue to offload  Follow-up next week

## 2024-01-24 NOTE — ASSESSMENT & PLAN NOTE
Sacrum  Wound improved, increasing granulation  Local wound care with Triad paste  Continue to offload  Increase protein intake  Follow-up next week

## 2024-01-24 NOTE — PROGRESS NOTES
[facility] WOUND CARE MANAGEMENT   AND HYPERBARIC MEDICINE CENTER       Patient ID: Amanda Orr is a 89 y.o. female Date of Birth 5/7/1926     Location of Service: [facility]    Performed wound round with: Wound team     Chief Complaint : Multiple wounds    Wound Instructions:  Wound:  Sacrum  Discontinue previous wound order  Cleanse the wound bed with NSS   Apply non-sting skin prep to periwound area  Apply triad paste to wound bed, then cover with ABD pad  Frequency : daily and prn for soiling    Wound : Left lateral lower leg and right lateral lower leg  Cleanse with normal saline solution or wound cleanser  Apply Skin-Prep to periwound area  Apply bordered foam to wound bed   Three times a week and as needed for soiling    Wound: Left lower leg anterior, right lower leg anterior, right medial knee  Cleanse with normal saline solution or wound cleanser  Apply Betadine to wound bed, GRETA  Daily and as needed for soiling    Pressure relief cushion for the recliner chair    Offload all wounds  Turn and reposition frequently,   Increase protein intake.  Monitor for any sign of infection or worsening, inform PCP or patient's primary physician in your facility.      Allergies  Patient has no allergy information on record.      Assessment & Plan:  1. Pressure injury of sacral region, stage 3 ([facility])  Assessment & Plan:  Sacrum  Wound improved, increasing granulation  Local wound care with Triad paste  Continue to offload  Increase protein intake  Follow-up next week      2. Ambulatory dysfunction  Assessment & Plan:  On 24/7 restorative care      3. Degloving injury  Assessment & Plan:  Left lower leg anterior  Stable, 100% eschar, periwound is red due to the Xeroform dressing.  Education provided to staff not to apply Xeroform on the patient's wound.  Local wound care with Betadine  Continue to offload  Follow-up next week      4. Traumatic injury to skin or subcutaneous tissue  Assessment & Plan:  Bilateral  lower legs  All wounds are stable  Change local wound care to left lateral calf and right lateral calf with bordered foam  The wound bed on right anterior lower leg and right medial knee is covered with eschar, local wound care with Betadine  Continue to offload  Increase protein intake  Follow-up next week                     Subjective:   1/9/2024This is a 89 y.o., female referred to our service for wound/ skin alterations on bilateral lower legs and sacrum.Patient have a complex medical history including but not limited to closed fracture of left hip and hypokalemia. Patient was referred by Senior Care Team. Patient was seen in collaboration with the facility wound team.     Wound History: Patient is a poor historian and was not able to provide information related to the wound.  It is not clear where the wound on the bilateral lower legs came from.  Patient previously under the care of hospice.  The wound on the sacrum is pressure ulcer.    Received patient on recliner chair, seems comfortable.  Patient needs assistance with turning and repositioning in bed.  Patient is incontinent of both bowel and bladder.    1/17/2024 Follow up for multiple wounds . Received patient, not in distress. Facility staff did not report any significant issues related to the wound. Denies pain.     1/23/2024 Follow up for wound on the multiple wounds . Received patient, not in distress. Facility staff did not report any significant issues related to the wound. Treatment removed on the left lower leg anterior wound - xerofoam.                 Review of Systems   Constitutional: Negative.    Respiratory: Negative.     Cardiovascular: Negative.    Musculoskeletal:  Positive for gait problem.   Skin:  Positive for wound.   Psychiatric/Behavioral: Negative.         Objective:    Physical Exam  Constitutional:       Comments: Seems frail   Cardiovascular:      Rate and Rhythm: Normal rate.   Pulmonary:      Effort: Pulmonary effort is  normal.   Musculoskeletal:      Comments: LROM   Skin:     Findings: Lesion present.      Comments: Left lower leg: Wound size is 6 x 1 cm.,  100% eschar, periwound is red, no drainage, no obvious sign of infection    Left lateral calf: Wound size is 1.5 x 1 x 0.1 cm.,  100% epithelial, small amount of serous drainage, periwound normal, with no obvious sign of infection    Right lower leg: Wound size is 2 x 5 cm.,  100% eschar, no drainage, no obvious sign of infection    Right lateral calf: Wound size is 4 x 1.5 times point 1 cm.,  100% epithelial, small amount of serous drainage, periwound normal, with no obvious sign of infection    Sacrum: Wound size is 2 x 2.5 x 0.1 cm.,  100% granulation, periwound is normal, small amount of serous drainage, with no obvious sign of infection              Procedures           Patient's care was coordinated with nursing facility staff. Recent vitals, labs and updated medications were reviewed on EMR or chart system of facility. Past Medical, surgical, social, medication and allergy history and patient's previous records were reviewed and updated as appropriate: Most up-to date information is available in the facility EMR where the patient is currently admitted.    Patient Active Problem List   Diagnosis    Chronic diastolic heart failure (HCC)    Palliative care patient    Paroxysmal atrial fibrillation (HCC)    Primary hypertension    Stage 3a chronic kidney disease (CKD) (HCC)    Closed fracture of left hip (HCC)    Pressure injury of sacral region, stage 3 (HCC)    Hypokalemia    CKD (chronic kidney disease), stage IV (HCC)    Debility    Acquired hypothyroidism    Degloving injury    Ambulatory dysfunction    Traumatic injury to skin or subcutaneous tissue    Moderate protein-calorie malnutrition (HCC)     History reviewed. No pertinent past medical history.  History reviewed. No pertinent surgical history.  Social History     Socioeconomic History    Marital status: Single      Spouse name: None    Number of children: None    Years of education: None    Highest education level: None   Occupational History    None   Tobacco Use    Smoking status: Former     Types: Cigarettes    Smokeless tobacco: Never   Substance and Sexual Activity    Alcohol use: None    Drug use: None    Sexual activity: None   Other Topics Concern    None   Social History Narrative    None     Social Determinants of Health     Financial Resource Strain: Low Risk  (6/5/2023)    Received from [facility]    Overall Financial Resource Strain (CARDIA)     Difficulty of Paying Living Expenses: Not hard at all   Food Insecurity: No Food Insecurity (6/5/2023)    Received from [facility]    Hunger Vital Sign     Worried About Running Out of Food in the Last Year: Never true     Ran Out of Food in the Last Year: Never true   Transportation Needs: No Transportation Needs (6/5/2023)    Received from [facility]    PRAPARE - Transportation     Lack of Transportation (Medical): No     Lack of Transportation (Non-Medical): No   Physical Activity: Not on file   Stress: Not on file   Social Connections: Not on file   Intimate Partner Violence: Not At Risk (6/5/2023)    Received from [facility]    Humiliation, Afraid, Rape, and Kick questionnaire     Fear of Current or Ex-Partner: No     Emotionally Abused: No     Physically Abused: No     Sexually Abused: No   Housing Stability: Low Risk  (6/5/2023)    Received from [facility]    Housing Stability Vital Sign     Unable to Pay for Housing in the Last Year: No     Number of Places Lived in the Last Year: 1     Unstable Housing in the Last Year: No        Current Outpatient Medications:     apixaban (Eliquis) 2.5 mg, Take 2.5 mg by mouth 2 (two) times a day, Disp: , Rfl:     atorvastatin (LIPITOR) 20 mg tablet, Take 20 mg by mouth daily, Disp: , Rfl:     calcium carbonate-vitamin D 250 mg-3.125  "mcg per tablet, Take 1 tablet by mouth 2 (two) times a day, Disp: , Rfl:     levothyroxine (Synthroid) 88 mcg tablet, Take 88 mcg by mouth daily, Disp: , Rfl:     Melatonin 10-10 MG TBCR, Take 10 mg by mouth daily at bedtime, Disp: , Rfl:     metoprolol succinate (TOPROL-XL) 25 mg 24 hr tablet, Take 25 mg by mouth daily, Disp: , Rfl:     torsemide (DEMADEX) 10 mg tablet, Take 10 mg by mouth daily, Disp: , Rfl:   History reviewed. No pertinent family history.           Coordination of Care: Wound team aware of the treatment plan. Facility nurse will provide wound treatment and monitor the wound for any changes.     Patient / Staff education : Patient / Staff was given education on sign of infection and pressure ulcer prevention. Patient/ Staff verbalized understanding     Follow up :  Next week    Voice-recognition software may have been used in the preparation of this document. Occasional wrong word or \"sound-alike\" substitutions may have occurred due to the inherent limitations of voice recognition software. Interpretation should be guided by context.      YOAN Germain  "

## 2024-01-24 NOTE — ASSESSMENT & PLAN NOTE
Bilateral lower legs  All wounds are stable  Change local wound care to left lateral calf and right lateral calf with bordered foam  The wound bed on right anterior lower leg and right medial knee is covered with eschar, local wound care with Betadine  Continue to offload  Increase protein intake  Follow-up next week

## 2024-01-26 ENCOUNTER — NURSING HOME VISIT (OUTPATIENT)
Dept: GERIATRICS | Facility: OTHER | Age: 89
End: 2024-01-26
Payer: COMMERCIAL

## 2024-01-26 DIAGNOSIS — I50.32 CHRONIC DIASTOLIC HEART FAILURE (HCC): ICD-10-CM

## 2024-01-26 DIAGNOSIS — R06.02 SHORTNESS OF BREATH: ICD-10-CM

## 2024-01-26 DIAGNOSIS — L89.153 PRESSURE INJURY OF SACRAL REGION, STAGE 3 (HCC): ICD-10-CM

## 2024-01-26 DIAGNOSIS — N18.4 CKD (CHRONIC KIDNEY DISEASE), STAGE IV (HCC): ICD-10-CM

## 2024-01-26 DIAGNOSIS — Z71.89 COUNSELING REGARDING ADVANCE CARE PLANNING AND GOALS OF CARE: ICD-10-CM

## 2024-01-26 DIAGNOSIS — R53.81 DEBILITY: ICD-10-CM

## 2024-01-26 DIAGNOSIS — E43 SEVERE PROTEIN-CALORIE MALNUTRITION (HCC): Primary | ICD-10-CM

## 2024-01-26 PROCEDURE — 99310 SBSQ NF CARE HIGH MDM 45: CPT | Performed by: NURSE PRACTITIONER

## 2024-01-26 NOTE — ASSESSMENT & PLAN NOTE
With continued weight loss in setting of poor appetite and poor oral intake  Loss of muscle and subcutaneous tissue  Continue regular diet and offer foods that patient enjoys eating  Continue Dangelo nutritional supplements twice daily  Continue to monitor weights

## 2024-01-26 NOTE — ASSESSMENT & PLAN NOTE
Wt Readings from Last 3 Encounters:   01/02/24 33.6 kg (74 lb)   With severe diastolic dysfunction as per most recent echo  Easily dyspneic on exertion  Oxygen supplementation via nasal cannula ordered for cardiac support  No edema noted  Continue torsemide; monitor for dehydration  Continue CHF pathway/daily weights

## 2024-01-26 NOTE — ASSESSMENT & PLAN NOTE
Spoke to patient's son today at length regarding clinical assessment findings/poor prognosis, hospice discussion and answered all questions and concerns.  Patient's son is opting for hospice care as he feels that would be appropriate for his mother but will need to speak to her first before making final decision

## 2024-01-26 NOTE — ASSESSMENT & PLAN NOTE
Patient is at high risk for worsening kidney failure in setting of diuretic requirement for CHF  Renal dose medications  Avoid nephrotoxic medications  Ensure adequate hydration

## 2024-01-26 NOTE — ASSESSMENT & PLAN NOTE
Sacrum  Wound improving as per [facility] wound CRNP  Continue local wound care with Triad paste  Continue to offload  Continue protein supplements

## 2024-01-26 NOTE — PROGRESS NOTES
"Facility: [facility]  POS: 31 (STR)  Progress Note    Chief Complaint/Reason for visit: STR follow up visit   Code status: DNR  History of Present Illness: 89-year-old female seen and examined twice today, once in the morning after her discharge session with therapy, then again in the afternoon after patient's son reported that patient complained of having \" an episode\". He described it as a blank stare then her eyes would roll back and this was not the first time.  Patient's vital signs were assessed by her nurse which were at her baseline.  She may be having some type of silent seizure activity but patient's son does not wish to pursue any diagnostic testing.  Patient has chronic labored respirations with conversational dyspnea. I advised nursing to apply oxygen for cardiac support.  Patient has lost 5 pounds and she reports that her appetite is very poor, completing 25% of meals or less.  Patient is at high risk for rapid deterioration in setting of severe diastolic dysfunction of left ventricle and severe chronic kidney disease.  Spoke to patient's son Ady Sue at length via phone regarding clinical assessment findings, his concerns, goals of care which included hospice discussion.  Patient's son stated that he agrees to hospice care, but will need to speak to his mother about hospice first.  He thanked me for the phone call and had no further questions prior to hanging up.  Patient requires moderate assist for transfers and maximum to total assistance with ADLs.  Sacral and bilateral lower extremity wounds managed by [facility] wound team.  Patient will transition to long-term care at [facility] on 1/28/2024.  Past Medical History: unchanged from history and physical  Family History: Unchanged from history and physical  Social History: Unchanged from history and physical  Review of systems: As per review of medical illness, all other systems reviewed and negative.  Medications: All " medication and routine orders were reviewed and updated  Allergies: Reviewed and unchanged  Consults reviewed:PT, OT, and Other  Labs/Diagnostics (reviewed by this provider): Copy in Chart  Imaging Reviewed:  Physical Exam  Weight: 67.6 pounds     Temp: 97           BP: 100/60  pulse: 56      resp: 24      O2 Sat: 88% on room air  Orientation:Person, Place, and Day     Physical Exam  Vitals and nursing note reviewed.   Constitutional:       General: She is not in acute distress.     Appearance: She is ill-appearing. She is not toxic-appearing or diaphoretic.      Comments: Frail and cachectic female who appears with chronic illness.   HENT:      Head: Normocephalic.      Nose: No congestion or rhinorrhea.      Mouth/Throat:      Mouth: Mucous membranes are moist.      Pharynx: No oropharyngeal exudate.   Eyes:      General:         Right eye: No discharge.         Left eye: No discharge.      Extraocular Movements: Extraocular movements intact.      Conjunctiva/sclera: Conjunctivae normal.   Cardiovascular:      Rate and Rhythm: Bradycardia present.      Pulses: Normal pulses.   Pulmonary:      Breath sounds: No wheezing.      Comments: With chronic labored respirations.  Decreased breath sounds noted bilateral lung fields.  Easily dyspneic on exertion and also with conversational dyspnea noted.  Abdominal:      General: Bowel sounds are normal. There is no distension.      Palpations: Abdomen is soft.      Tenderness: There is no abdominal tenderness. There is no guarding.   Musculoskeletal:      Cervical back: Neck supple. No rigidity.      Right lower leg: No edema.      Left lower leg: No edema.      Comments: Moves all 4 extremities.   Lymphadenopathy:      Cervical: No cervical adenopathy.   Skin:     General: Skin is warm and dry.      Capillary Refill: Capillary refill takes less than 2 seconds.      Comments: Vascular changes noted to bilateral lower extremities.  Sacral wound.  Bilateral lower extremity  wounds.   Neurological:      Mental Status: She is alert and oriented to person, place, and time.      Motor: Weakness present.      Gait: Gait abnormal.   Psychiatric:         Mood and Affect: Mood normal.         Behavior: Behavior normal.         Thought Content: Thought content normal.       Assessment/Plan:  89-year-old female with:    Severe protein-calorie malnutrition (HCC)  With continued weight loss in setting of poor appetite and poor oral intake  Loss of muscle and subcutaneous tissue  Continue regular diet and offer foods that patient enjoys eating  Continue Dangelo nutritional supplements twice daily  Continue to monitor weights    Counseling regarding advance care planning and goals of care  Spoke to patient's son today at length regarding clinical assessment findings/poor prognosis, hospice discussion and answered all questions and concerns.  Patient's son is opting for hospice care as he feels that would be appropriate for his mother but will need to speak to her first before making final decision    Shortness of breath  In setting of CHF  Provide oxygen supplementation via nasal cannula to maintain O2 sat >88% or for SOB    Chronic diastolic heart failure (HCC)  Wt Readings from Last 3 Encounters:   01/02/24 33.6 kg (74 lb)   With severe diastolic dysfunction as per most recent echo  Easily dyspneic on exertion  Oxygen supplementation via nasal cannula ordered for cardiac support  No edema noted  Continue torsemide; monitor for dehydration  Continue CHF pathway/daily weights    CKD (chronic kidney disease), stage IV (HCC)  Patient is at high risk for worsening kidney failure in setting of diuretic requirement for CHF  Renal dose medications  Avoid nephrotoxic medications  Ensure adequate hydration    Pressure injury of sacral region, stage 3 (HCC)  Sacrum  Wound improving as per [facility] wound CRNP  Continue local wound care with Triad paste  Continue to offload  Continue protein  supplements    Debility  Multifactorial  Continue supportive care  Patient requires moderate assist with transfers, maximum to total assistance with ADLs as per physical therapist  Patient will transition to long-term care at [facility] on 1/28/2024    I have spent a total time of 45 minutes on 01/26/24 in caring for this patient including Diagnostic results, Prognosis, Risks and benefits of tx options, Patient and family education, Impressions, Counseling / Coordination of care, Documenting in the medical record, Reviewing / ordering tests, medicine, procedures  , Obtaining or reviewing history  , and Communicating with other healthcare professionals .  Advance care planning and goals of care which included hospice discussion.  Social service made aware of above.    This note was completed in part utilizing Smartaxi direct voice recognition software.  Grammatical errors, random word insertion, spelling mistakes, and incomplete sentences may be an occasional consequence of the system secondary to software limitations, ambient noise and hardware issues.  At the time of dictation, efforts were made to edit, clarify and/or correct errors.  Please read the chart carefully and recognize, using context, where substitutions have occurred.  If you have any questions or concerns about the context, text or information contained within the body of this dictation, please contact myself, the provider, for further clarification.    YOAN Wade  1/26/20245:15 PM

## 2024-01-30 ENCOUNTER — NURSING HOME VISIT (OUTPATIENT)
Dept: WOUND CARE | Facility: HOSPITAL | Age: 89
End: 2024-01-30
Payer: COMMERCIAL

## 2024-01-30 DIAGNOSIS — T14.8XXA DEGLOVING INJURY: ICD-10-CM

## 2024-01-30 DIAGNOSIS — R26.2 AMBULATORY DYSFUNCTION: ICD-10-CM

## 2024-01-30 DIAGNOSIS — T14.8XXA TRAUMATIC INJURY TO SKIN OR SUBCUTANEOUS TISSUE: ICD-10-CM

## 2024-01-30 DIAGNOSIS — L89.153 PRESSURE INJURY OF SACRAL REGION, STAGE 3 (HCC): Primary | ICD-10-CM

## 2024-01-30 PROCEDURE — 97597 DBRDMT OPN WND 1ST 20 CM/<: CPT | Performed by: NURSE PRACTITIONER

## 2024-01-30 PROCEDURE — 99308 SBSQ NF CARE LOW MDM 20: CPT | Performed by: NURSE PRACTITIONER

## 2024-01-30 NOTE — LETTER
Patient:  Amanda Orr   5/7/1926           YOAN Germain saw Amanda Orr for a wound care visit on 1/30/2024. See below for information relating to this visit.      Chief Complaint   Patient presents with    Follow Up Wound Care Visit        Assessment/Plan:  1. Pressure injury of sacral region, stage 3 (HCC)  Assessment & Plan:  Sacrum  Wound improved, wound size is 1 x 2 x 0.1 cm, 100% granulation  Continue local wound care with Triad paste  Continue to offload  Follow-up next week      2. Ambulatory dysfunction  Assessment & Plan:  On 24/7 restorative care      3. Degloving injury  Assessment & Plan:  Left lower leg anterior  Stable, present eschar   local wound care with Betadine  Continue to offload  Follow-up next week      4. Traumatic injury to skin or subcutaneous tissue  Assessment & Plan:  Wound on the left lower leg improved, 100% eschar, with no obvious sign of infection.  I changed the wound treatment on the left lower leg lateral to Betadine  The wound on the right lower leg lateral worsened, increased slough, with no obvious sign of infection  Selective debridement performed  Change local wound care to collagen alginate  Follow-up next week             Orders:  Amanda Orr  5/7/1926  Orders Placed This Encounter   Procedures    Debridement     This order was created via procedure documentation   Wound:  Sacrum  Discontinue previous wound order  Cleanse the wound bed with NSS   Apply non-sting skin prep to periwound area  Apply triad paste to wound bed, then cover with ABD pad  Frequency : daily and prn for soiling    Wound :  right lateral lower leg  Cleanse with normal saline solution or wound cleanser  Apply Skin-Prep to periwound area  Apply calcium alginate to wound bed and cover with ABD pad and wrap with rolled gauze  Three times a week and as needed for soiling    Wound: Left lower leg anterior, right lower leg anterior, right medial knee, left lower leg lateral  Cleanse  with normal saline solution or wound cleanser  Apply Betadine to wound bed, GRETA  Daily and as needed for soiling    Pressure relief cushion for the recliner chair    Offload all wounds  Turn and reposition frequently,   Increase protein intake.  Monitor for any sign of infection or worsening, inform PCP or patient's primary physician in your facility.          Follow Up:  Return in about 1 week (around 2/6/2024).       [facility] hours are 8:00 am - 4:30 pm Monday through Friday. The center phone number is [phone]. You can also contact me directly thru my email at [email or web address] or thru tiger text. If it is an emergency, please contact the PCP or patient's attending physician in your facility.     Sincerely,    Electronically signed by YOAN Germain    Patient : Amanda Orr    5/7/1926

## 2024-01-30 NOTE — PROGRESS NOTES
[facility] WOUND CARE MANAGEMENT   AND HYPERBARIC MEDICINE CENTER       Patient ID: Amanda Orr is a 89 y.o. female Date of Birth 5/7/1926     Location of Service: [facility]    Performed wound round with: Wound team     Chief Complaint : Multiple wounds    Wound Instructions:  Wound:  Sacrum  Discontinue previous wound order  Cleanse the wound bed with NSS   Apply non-sting skin prep to periwound area  Apply triad paste to wound bed, then cover with ABD pad  Frequency : daily and prn for soiling    Wound :  right lateral lower leg  Cleanse with normal saline solution or wound cleanser  Apply Skin-Prep to periwound area  Apply calcium alginate to wound bed and cover with ABD pad and wrap with rolled gauze  Three times a week and as needed for soiling    Wound: Left lower leg anterior, right lower leg anterior, right medial knee, left lower leg lateral  Cleanse with normal saline solution or wound cleanser  Apply Betadine to wound bed, GRETA  Daily and as needed for soiling    Pressure relief cushion for the recliner chair    Offload all wounds  Turn and reposition frequently,   Increase protein intake.  Monitor for any sign of infection or worsening, inform PCP or patient's primary physician in your facility.      Allergies  Patient has no allergy information on record.      Assessment & Plan:  1. Pressure injury of sacral region, stage 3 ([facility])  Assessment & Plan:  Sacrum  Wound improved, wound size is 1 x 2 x 0.1 cm, 100% granulation  Continue local wound care with Triad paste  Continue to offload  Follow-up next week      2. Ambulatory dysfunction  Assessment & Plan:  On 24/7 restorative care      3. Degloving injury  Assessment & Plan:  Left lower leg anterior  Stable, present eschar   local wound care with Betadine  Continue to offload  Follow-up next week      4. Traumatic injury to skin or subcutaneous tissue  Assessment & Plan:  Wound on the left lower leg improved, 100% eschar, with no obvious sign  of infection.  I changed the wound treatment on the left lower leg lateral to Betadine  The wound on the right lower leg lateral worsened, increased slough, with no obvious sign of infection  Selective debridement performed  Change local wound care to collagen alginate  Follow-up next week                     Subjective:   1/9/2024This is a 89 y.o., female referred to our service for wound/ skin alterations on bilateral lower legs and sacrum.Patient have a complex medical history including but not limited to closed fracture of left hip and hypokalemia. Patient was referred by Senior Care Team. Patient was seen in collaboration with the facility wound team.     Wound History: Patient is a poor historian and was not able to provide information related to the wound.  It is not clear where the wound on the bilateral lower legs came from.  Patient previously under the care of hospice.  The wound on the sacrum is pressure ulcer.    Received patient on recliner chair, seems comfortable.  Patient needs assistance with turning and repositioning in bed.  Patient is incontinent of both bowel and bladder.    1/17/2024 Follow up for multiple wounds . Received patient, not in distress. Facility staff did not report any significant issues related to the wound. Denies pain.     1/23/2024 Follow up for wound on the multiple wounds . Received patient, not in distress. Facility staff did not report any significant issues related to the wound. Treatment removed on the left lower leg anterior wound - xerofoam.     1/30/2024 Follow up for wound on the BLE and sacrum . Received patient, not in distress. Facility staff did not report any significant issues related to the wound. Denies pain. Dressing removed bordered foam on BLE.                  Review of Systems   Constitutional: Negative.    Respiratory: Negative.     Cardiovascular: Negative.    Musculoskeletal:  Positive for gait problem.   Skin:  Positive for wound.  "  Psychiatric/Behavioral: Negative.         Objective:    Physical Exam  Constitutional:       Comments: Seems frail   Cardiovascular:      Rate and Rhythm: Normal rate.   Pulmonary:      Effort: Pulmonary effort is normal.   Musculoskeletal:      Comments: LROM   Skin:     Findings: Lesion present.      Comments: Left lower leg lateral: Wound size is 0.5 x 0.5 cm.,  100% epithelial, no drainage, periwound normal, with no obvious sign of infection    Left lower leg anterior: Wound size is .5 x 1 cm.,  100% eschar, no drainage, periwound normal, with no obvious sign of infection    Right lower leg anterior: 100% eschar, no drainage, no obvious sign of infection    Right lower leg lateral: Wound size is 3.5 x 1.7 x .1 cm.,  100% slough, small amount of serous drainage, periwound is normal, with no obvious sign of infection    Sacrum : Wound size is 1 x 2 x 0.1 cm.,  100% granulation, small amount of serous drainage, periwound is normal, with no obvious sign of infection              Debridement    Universal Protocol:  Consent: Verbal consent obtained.  Risks and benefits: risks, benefits and alternatives were discussed  Consent given by: patient  Time out: Immediately prior to procedure a \"time out\" was called to verify the correct patient, procedure, equipment, support staff and site/side marked as required.  Timeout called at: 1/30/2024 11:35 AM.  Patient understanding: patient states understanding of the procedure being performed  Patient identity confirmed: verbally with patient    Debridement Details  Performed by: NP (Right lower leg lateral)  Debridement type: selective  Pain control: lidocaine 4%      Post-debridement measurements  Length (cm): 3.5  Width (cm): 1.7  Depth (cm): 0.1  Percent debrided: 100%  Surface Area (cm^2): 5.95  Area Debrided (cm^2): 5.95  Volume (cm^3): 0.6    Devitalized tissue debrided: biofilm, fibrin and slough  Instrument(s) utilized: curette  Bleeding: small  Hemostasis obtained " with: pressure  Procedural pain (0-10): 0  Post-procedural pain: 0   Response to treatment: procedure was tolerated well               Patient's care was coordinated with nursing facility staff. Recent vitals, labs and updated medications were reviewed on EMR or chart system of facility. Past Medical, surgical, social, medication and allergy history and patient's previous records were reviewed and updated as appropriate: Most up-to date information is available in the facility EMR where the patient is currently admitted.    Patient Active Problem List   Diagnosis    Chronic diastolic heart failure (HCC)    Palliative care patient    Paroxysmal atrial fibrillation (HCC)    Primary hypertension    Stage 3a chronic kidney disease (CKD) ([facility])    Closed fracture of left hip (HCC)    Pressure injury of sacral region, stage 3 (HCC)    Hypokalemia    CKD (chronic kidney disease), stage IV (HCC)    Debility    Acquired hypothyroidism    Degloving injury    Ambulatory dysfunction    Traumatic injury to skin or subcutaneous tissue    Severe protein-calorie malnutrition (HCC)    Counseling regarding advance care planning and goals of care    Shortness of breath     History reviewed. No pertinent past medical history.  History reviewed. No pertinent surgical history.  Social History     Socioeconomic History    Marital status: Single     Spouse name: None    Number of children: None    Years of education: None    Highest education level: None   Occupational History    None   Tobacco Use    Smoking status: Former     Types: Cigarettes    Smokeless tobacco: Never   Substance and Sexual Activity    Alcohol use: None    Drug use: None    Sexual activity: None   Other Topics Concern    None   Social History Narrative    None     Social Determinants of Health     Financial Resource Strain: Low Risk  (6/5/2023)    Received from [facility]    Overall Financial Resource Strain (CARDIA)     Difficulty of Paying Living  Expenses: Not hard at all   Food Insecurity: No Food Insecurity (6/5/2023)    Received from [facility]    Hunger Vital Sign     Worried About Running Out of Food in the Last Year: Never true     Ran Out of Food in the Last Year: Never true   Transportation Needs: No Transportation Needs (6/5/2023)    Received from [facility]    PRAPARE - Transportation     Lack of Transportation (Medical): No     Lack of Transportation (Non-Medical): No   Physical Activity: Not on file   Stress: Not on file   Social Connections: Not on file   Intimate Partner Violence: Not At Risk (6/5/2023)    Received from [facility]    Humiliation, Afraid, Rape, and Kick questionnaire     Fear of Current or Ex-Partner: No     Emotionally Abused: No     Physically Abused: No     Sexually Abused: No   Housing Stability: Low Risk  (6/5/2023)    Received from [facility]    Housing Stability Vital Sign     Unable to Pay for Housing in the Last Year: No     Number of Places Lived in the Last Year: 1     Unstable Housing in the Last Year: No        Current Outpatient Medications:     apixaban (Eliquis) 2.5 mg, Take 2.5 mg by mouth 2 (two) times a day, Disp: , Rfl:     atorvastatin (LIPITOR) 20 mg tablet, Take 20 mg by mouth daily, Disp: , Rfl:     calcium carbonate-vitamin D 250 mg-3.125 mcg per tablet, Take 1 tablet by mouth 2 (two) times a day, Disp: , Rfl:     levothyroxine (Synthroid) 88 mcg tablet, Take 88 mcg by mouth daily, Disp: , Rfl:     Melatonin 10-10 MG TBCR, Take 10 mg by mouth daily at bedtime, Disp: , Rfl:     metoprolol succinate (TOPROL-XL) 25 mg 24 hr tablet, Take 25 mg by mouth daily, Disp: , Rfl:     torsemide (DEMADEX) 10 mg tablet, Take 10 mg by mouth daily, Disp: , Rfl:   History reviewed. No pertinent family history.           Coordination of Care: Wound team aware of the treatment plan. Facility nurse will provide wound treatment and monitor the wound for  "any changes.     Patient / Staff education : Patient / Staff was given education on sign of infection and pressure ulcer prevention. Patient/ Staff verbalized understanding     Follow up :  Next week    Voice-recognition software may have been used in the preparation of this document. Occasional wrong word or \"sound-alike\" substitutions may have occurred due to the inherent limitations of voice recognition software. Interpretation should be guided by context.      YOAN Germain  "

## 2024-01-31 NOTE — ASSESSMENT & PLAN NOTE
Sacrum  Wound improved, wound size is 1 x 2 x 0.1 cm, 100% granulation  Continue local wound care with Triad paste  Continue to offload  Follow-up next week

## 2024-01-31 NOTE — ASSESSMENT & PLAN NOTE
Left lower leg anterior  Stable, present eschar   local wound care with Betadine  Continue to offload  Follow-up next week

## 2024-01-31 NOTE — ASSESSMENT & PLAN NOTE
Wound on the left lower leg improved, 100% eschar, with no obvious sign of infection.  I changed the wound treatment on the left lower leg lateral to Betadine  The wound on the right lower leg lateral worsened, increased slough, with no obvious sign of infection  Selective debridement performed  Change local wound care to collagen alginate  Follow-up next week

## 2024-02-06 ENCOUNTER — NURSING HOME VISIT (OUTPATIENT)
Dept: WOUND CARE | Facility: HOSPITAL | Age: 89
End: 2024-02-06
Payer: COMMERCIAL

## 2024-02-06 DIAGNOSIS — T14.8XXA DEGLOVING INJURY: ICD-10-CM

## 2024-02-06 DIAGNOSIS — L89.150 PRESSURE INJURY OF SACRAL REGION, UNSTAGEABLE (HCC): ICD-10-CM

## 2024-02-06 DIAGNOSIS — R26.2 AMBULATORY DYSFUNCTION: Primary | ICD-10-CM

## 2024-02-06 DIAGNOSIS — T14.8XXA TRAUMATIC INJURY TO SKIN OR SUBCUTANEOUS TISSUE: ICD-10-CM

## 2024-02-06 PROCEDURE — 99309 SBSQ NF CARE MODERATE MDM 30: CPT | Performed by: NURSE PRACTITIONER

## 2024-02-06 NOTE — LETTER
Patient:  Amanda Orr   5/7/1926           YOAN Germain saw Amanda Orr for a wound care visit on 2/6/2024. See below for information relating to this visit.      Chief Complaint   Patient presents with    Follow Up Wound Care Visit        Assessment/Plan:  1. Ambulatory dysfunction  Assessment & Plan:  On 24/7 restorative care      2. Degloving injury  Assessment & Plan:  Left lower leg anterior  Wound improved, decrease in wound size, 100% eschar  Continue Betadine        3. Traumatic injury to skin or subcutaneous tissue  Assessment & Plan:  Wound on the right lower leg is healed  Right lateral calf: Full-thickness, with no obvious sign of infection.  Local wound care with calcium alginate  Continue to offload  Follow-up next week      4. Pressure injury of sacral region, unstageable (HCC)  Assessment & Plan:  Sacrum  Wound is still covered with 50% slough, deepest tissue not visible  Change local wound care to Medihoney  Continue to offload.  Patient is always on the recliner chair, I will order a pressure relief cushion ( [employer])  Increase protein intake, under the care of RD  Follow-up next week             Orders:  Amanda Orr  5/7/1926  Wound:  Sacrum  Discontinue previous wound order  Cleanse the wound bed with NSS   Apply non-sting skin prep to periwound area  Apply medihoney to wound bed, then cover with bordered foam  Frequency : daily and prn for soiling    Wound :  right lateral lower leg  Cleanse with normal saline solution or wound cleanser  Apply Skin-Prep to periwound area  Apply calcium alginate to wound bed and cover with ABD pad and wrap with rolled gauze  Three times a week and as needed for soiling    Wound: Left lower leg anterior,  left lower leg lateral  Cleanse with normal saline solution or wound cleanser  Apply Betadine to wound bed, GRETA  Daily and as needed for soiling    Pressure relief cushion for the recliner chair    Offload all wounds  Turn and  reposition frequently,   Increase protein intake.  Monitor for any sign of infection or worsening, inform PCP or patient's primary physician in your facility.          Follow Up:  Return in about 1 week (around 2/13/2024).       [facility] hours are 8:00 am - 4:30 pm Monday through Friday. The center phone number is [phone]. You can also contact me directly thru my email at [email or web address] or thru tiger text. If it is an emergency, please contact the PCP or patient's attending physician in your facility.     Sincerely,    Electronically signed by YOAN Germain    Patient : Amanda Orr    5/7/1926

## 2024-02-06 NOTE — PROGRESS NOTES
[facility] WOUND CARE MANAGEMENT   AND HYPERBARIC MEDICINE CENTER       Patient ID: Amanda Orr is a 89 y.o. female Date of Birth 5/7/1926     Location of Service: [facility]    Performed wound round with: Wound team     Chief Complaint : Multiple wounds    Wound Instructions:  Wound:  Sacrum  Discontinue previous wound order  Cleanse the wound bed with NSS   Apply non-sting skin prep to periwound area  Apply medihoney to wound bed, then cover with bordered foam  Frequency : daily and prn for soiling    Wound :  right lateral lower leg  Cleanse with normal saline solution or wound cleanser  Apply Skin-Prep to periwound area  Apply calcium alginate to wound bed and cover with ABD pad and wrap with rolled gauze  Three times a week and as needed for soiling    Wound: Left lower leg anterior,  left lower leg lateral  Cleanse with normal saline solution or wound cleanser  Apply Betadine to wound bed, GRETA  Daily and as needed for soiling    Pressure relief cushion for the recliner chair    Offload all wounds  Turn and reposition frequently,   Increase protein intake.  Monitor for any sign of infection or worsening, inform PCP or patient's primary physician in your facility.      Allergies  Patient has no allergy information on record.      Assessment & Plan:  1. Ambulatory dysfunction  Assessment & Plan:  On 24/7 restorative care      2. Degloving injury  Assessment & Plan:  Left lower leg anterior  Wound improved, decrease in wound size, 100% eschar  Continue Betadine        3. Traumatic injury to skin or subcutaneous tissue  Assessment & Plan:  Wound on the right lower leg is healed  Right lateral calf: Full-thickness, with no obvious sign of infection.  Local wound care with calcium alginate  Continue to offload  Follow-up next week      4. Pressure injury of sacral region, unstageable (HCC)  Assessment & Plan:  Sacrum  Wound is still covered with 50% slough, deepest tissue not visible  Change local wound  care to [facility]  Continue to offload.  Patient is always on the recliner chair, I will order a pressure relief cushion ( [employer])  Increase protein intake, under the care of RD  Follow-up next week                       Subjective:   1/9/2024This is a 89 y.o., female referred to our service for wound/ skin alterations on bilateral lower legs and sacrum.Patient have a complex medical history including but not limited to closed fracture of left hip and hypokalemia. Patient was referred by Senior Care Team. Patient was seen in collaboration with the facility wound team.     Wound History: Patient is a poor historian and was not able to provide information related to the wound.  It is not clear where the wound on the bilateral lower legs came from.  Patient previously under the care of hospice.  The wound on the sacrum is pressure ulcer.    Received patient on recliner chair, seems comfortable.  Patient needs assistance with turning and repositioning in bed.  Patient is incontinent of both bowel and bladder.    1/17/2024 Follow up for multiple wounds . Received patient, not in distress. Facility staff did not report any significant issues related to the wound. Denies pain.     1/23/2024 Follow up for wound on the multiple wounds . Received patient, not in distress. Facility staff did not report any significant issues related to the wound. Treatment removed on the left lower leg anterior wound - xerofoam.     1/30/2024 Follow up for wound on the BLE and sacrum . Received patient, not in distress. Facility staff did not report any significant issues related to the wound. Denies pain. Dressing removed bordered foam on BLE.    2/6/2024 Follow up for wound on the BLE and sacrum . Received patient, not in distress. Facility staff did not report any significant issues related to the wound. Patient had been spending all her time in recliner chair.                     Review of Systems   Constitutional: Negative.     Respiratory: Negative.     Cardiovascular: Negative.    Musculoskeletal:  Positive for gait problem.   Skin:  Positive for wound.   Psychiatric/Behavioral: Negative.         Objective:    Physical Exam  Constitutional:       Comments: Seems frail   Cardiovascular:      Rate and Rhythm: Normal rate.   Pulmonary:      Effort: Pulmonary effort is normal.   Musculoskeletal:      Comments: LROM   Skin:     Findings: Lesion present.      Comments: Left lower leg anterior: Wound size is 4 x 0.6 cm.,  100% eschar, no drainage, no obvious sign of infection    Left lateral calf: Wound size is 1 x 1 cm.,  100% eschar, no drainage, periwound normal, with no obvious sign of infection    Right lower leg: Wound is healed    Right lateral calf: Wound size is 2.5 x 1 x 0.1 cm.,  100% granulation, small amount of serous drainage, 50% slough, 50% granulation, with no obvious sign of infection    Sacrum: Wound size is 1.5 x 2.5 x 0.2 cm.,  50% slough, 50% granulation, small amount of serous drainage, periwound is normal, with no obvious sign of infection              Procedures           Patient's care was coordinated with nursing facility staff. Recent vitals, labs and updated medications were reviewed on EMR or chart system of facility. Past Medical, surgical, social, medication and allergy history and patient's previous records were reviewed and updated as appropriate: Most up-to date information is available in the facility EMR where the patient is currently admitted.    Patient Active Problem List   Diagnosis    Chronic diastolic heart failure (HCC)    Palliative care patient    Paroxysmal atrial fibrillation ([facility])    Primary hypertension    Stage 3a chronic kidney disease (CKD) ([facility])    Closed fracture of left hip ([facility])    Pressure injury of sacral region, stage 3 (HCC)    Hypokalemia    CKD (chronic kidney disease), stage IV ([facility])    Debility    Acquired hypothyroidism    Degloving injury    Ambulatory dysfunction    Traumatic  injury to skin or subcutaneous tissue    Severe protein-calorie malnutrition (HCC)    Counseling regarding advance care planning and goals of care    Shortness of breath    Pressure injury of sacral region, unstageable (HCC)     History reviewed. No pertinent past medical history.  History reviewed. No pertinent surgical history.  Social History     Socioeconomic History    Marital status: Single     Spouse name: None    Number of children: None    Years of education: None    Highest education level: None   Occupational History    None   Tobacco Use    Smoking status: Former     Types: Cigarettes    Smokeless tobacco: Never   Substance and Sexual Activity    Alcohol use: None    Drug use: None    Sexual activity: None   Other Topics Concern    None   Social History Narrative    None     Social Determinants of Health     Financial Resource Strain: Low Risk  (6/5/2023)    Received from [facility]    Overall Financial Resource Strain (CARDIA)     Difficulty of Paying Living Expenses: Not hard at all   Food Insecurity: No Food Insecurity (6/5/2023)    Received from [facility]    Hunger Vital Sign     Worried About Running Out of Food in the Last Year: Never true     Ran Out of Food in the Last Year: Never true   Transportation Needs: No Transportation Needs (6/5/2023)    Received from [facility]    PRAPARE - Transportation     Lack of Transportation (Medical): No     Lack of Transportation (Non-Medical): No   Physical Activity: Not on file   Stress: Not on file   Social Connections: Not on file   Intimate Partner Violence: Not At Risk (6/5/2023)    Received from [facility]    Humiliation, Afraid, Rape, and Kick questionnaire     Fear of Current or Ex-Partner: No     Emotionally Abused: No     Physically Abused: No     Sexually Abused: No   Housing Stability: Low Risk  (6/5/2023)    Received from [facility]    Housing Stability  "Vital Sign     Unable to Pay for Housing in the Last Year: No     Number of Places Lived in the Last Year: 1     Unstable Housing in the Last Year: No        Current Outpatient Medications:     apixaban (Eliquis) 2.5 mg, Take 2.5 mg by mouth 2 (two) times a day, Disp: , Rfl:     atorvastatin (LIPITOR) 20 mg tablet, Take 20 mg by mouth daily, Disp: , Rfl:     calcium carbonate-vitamin D 250 mg-3.125 mcg per tablet, Take 1 tablet by mouth 2 (two) times a day, Disp: , Rfl:     levothyroxine (Synthroid) 88 mcg tablet, Take 88 mcg by mouth daily, Disp: , Rfl:     Melatonin 10-10 MG TBCR, Take 10 mg by mouth daily at bedtime, Disp: , Rfl:     metoprolol succinate (TOPROL-XL) 25 mg 24 hr tablet, Take 25 mg by mouth daily, Disp: , Rfl:     torsemide (DEMADEX) 10 mg tablet, Take 10 mg by mouth daily, Disp: , Rfl:   History reviewed. No pertinent family history.           Coordination of Care: Wound team aware of the treatment plan. Facility nurse will provide wound treatment and monitor the wound for any changes.     Patient / Staff education : Patient / Staff was given education on sign of infection and pressure ulcer prevention. Patient/ Staff verbalized understanding     Follow up :  Next week    Voice-recognition software may have been used in the preparation of this document. Occasional wrong word or \"sound-alike\" substitutions may have occurred due to the inherent limitations of voice recognition software. Interpretation should be guided by context.      YOAN Germain  "

## 2024-02-07 ENCOUNTER — HOME CARE VISIT (OUTPATIENT)
Dept: HOME HEALTH SERVICES | Facility: HOME HEALTHCARE | Age: 89
End: 2024-02-07
Payer: MEDICARE

## 2024-02-07 NOTE — ASSESSMENT & PLAN NOTE
Sacrum  Wound is still covered with 50% slough, deepest tissue not visible  Change local wound care to [facility]  Continue to offload.  Patient is always on the recliner chair, I will order a pressure relief cushion ( [employer])  Increase protein intake, under the care of RD  Follow-up next week

## 2024-02-07 NOTE — Clinical Note
Seeking approval for RLOC Amanda Orr [birth year] 89 YO year old female resident of [facility]. Dxs: diastolic heart failure, afib, Hx of closed Fx of left hip s/p repair 12/24/23, CKD 3, Palliative patient, debility, ambulatory dysfunction, severe PCM. Echo from 4/12/23 EF 50-55% Grade 3 (severe) diastolic dysfunction. Pt having episodes of blank stare then her eyes would roll back, may be having some type of silent seizure activity but patient's son does not wish to pursue any diagnostic testing. Has chronic labored respirations with conversational dyspnea. Nursing has applied oxygen for cardiac support. Patient has lost 5 pounds and she reports that her appetite is very poor, completing 25% of meals or less. Current weight 74 lbs. High risk for rapid deterioration in setting of severe diastolic dysfunction of left ventricle and severe chronic kidney disease. Patient requires moderate assist for transfers and maximum to total assistance with ADLs. Sacral and bilateral lower extremity wounds Labs 1/5/24 BUN 64 Creat 1.31 Albumin 3.2

## 2024-02-07 NOTE — ASSESSMENT & PLAN NOTE
Wound on the right lower leg is healed  Right lateral calf: Full-thickness, with no obvious sign of infection.  Local wound care with calcium alginate  Continue to offload  Follow-up next week

## 2024-02-08 ENCOUNTER — HOME CARE VISIT (OUTPATIENT)
Dept: HOME HOSPICE | Facility: HOSPICE | Age: 89
End: 2024-02-08
Payer: MEDICARE

## 2024-02-08 ENCOUNTER — HOME CARE VISIT (OUTPATIENT)
Dept: HOME HEALTH SERVICES | Facility: HOME HEALTHCARE | Age: 89
End: 2024-02-08
Payer: MEDICARE

## 2024-02-08 VITALS
SYSTOLIC BLOOD PRESSURE: 92 MMHG | RESPIRATION RATE: 16 BRPM | HEART RATE: 90 BPM | TEMPERATURE: 98.6 F | DIASTOLIC BLOOD PRESSURE: 50 MMHG

## 2024-02-08 PROCEDURE — G0155 HHCP-SVS OF CSW,EA 15 MIN: HCPCS

## 2024-02-08 PROCEDURE — G0299 HHS/HOSPICE OF RN EA 15 MIN: HCPCS

## 2024-02-09 ENCOUNTER — HOME CARE VISIT (OUTPATIENT)
Dept: HOME HEALTH SERVICES | Facility: HOME HEALTHCARE | Age: 89
End: 2024-02-09
Payer: MEDICARE

## 2024-02-09 PROCEDURE — G0300 HHS/HOSPICE OF LPN EA 15 MIN: HCPCS

## 2024-02-09 PROCEDURE — G0156 HHCP-SVS OF AIDE,EA 15 MIN: HCPCS

## 2024-02-10 ENCOUNTER — HOME CARE VISIT (OUTPATIENT)
Dept: HOME HEALTH SERVICES | Facility: HOME HEALTHCARE | Age: 89
End: 2024-02-10
Payer: MEDICARE

## 2024-02-10 PROCEDURE — G0156 HHCP-SVS OF AIDE,EA 15 MIN: HCPCS

## 2024-02-12 ENCOUNTER — HOME CARE VISIT (OUTPATIENT)
Dept: HOME HEALTH SERVICES | Facility: HOME HEALTHCARE | Age: 89
End: 2024-02-12
Payer: MEDICARE

## 2024-02-12 VITALS — DIASTOLIC BLOOD PRESSURE: 60 MMHG | SYSTOLIC BLOOD PRESSURE: 108 MMHG | HEART RATE: 94 BPM

## 2024-02-12 PROCEDURE — G0156 HHCP-SVS OF AIDE,EA 15 MIN: HCPCS

## 2024-02-12 PROCEDURE — G0299 HHS/HOSPICE OF RN EA 15 MIN: HCPCS

## 2024-02-13 ENCOUNTER — NURSING HOME VISIT (OUTPATIENT)
Dept: WOUND CARE | Facility: HOSPITAL | Age: 89
End: 2024-02-13
Payer: COMMERCIAL

## 2024-02-13 ENCOUNTER — HOME CARE VISIT (OUTPATIENT)
Dept: HOME HOSPICE | Facility: HOSPICE | Age: 89
End: 2024-02-13
Payer: MEDICARE

## 2024-02-13 DIAGNOSIS — R26.2 AMBULATORY DYSFUNCTION: Primary | ICD-10-CM

## 2024-02-13 DIAGNOSIS — T14.8XXA DEGLOVING INJURY: ICD-10-CM

## 2024-02-13 DIAGNOSIS — Z51.5 HOSPICE CARE PATIENT: ICD-10-CM

## 2024-02-13 DIAGNOSIS — L89.150 PRESSURE INJURY OF SACRAL REGION, UNSTAGEABLE (HCC): ICD-10-CM

## 2024-02-13 DIAGNOSIS — T14.8XXA TRAUMATIC INJURY TO SKIN OR SUBCUTANEOUS TISSUE: ICD-10-CM

## 2024-02-13 PROCEDURE — 97597 DBRDMT OPN WND 1ST 20 CM/<: CPT | Performed by: NURSE PRACTITIONER

## 2024-02-13 PROCEDURE — 99309 SBSQ NF CARE MODERATE MDM 30: CPT | Performed by: NURSE PRACTITIONER

## 2024-02-13 NOTE — ASSESSMENT & PLAN NOTE
Wound on the left lateral lower leg is healed  Right lateral calf: stable, with slough, with no obvious sign of infection.  Local wound care with calcium alginate  Continue to offload  Follow-up next week

## 2024-02-13 NOTE — ASSESSMENT & PLAN NOTE
Sacrum  Wound almost the same as last week, with 50% slough  Selective debridement performed  Continue ocal wound care to [facility]  Continue to offload.   Pressure relief cushion on the recliner chair  Air mattress  Increase protein intake, under the care of RD  Follow-up next week

## 2024-02-13 NOTE — PROGRESS NOTES
[facility] WOUND CARE MANAGEMENT   AND HYPERBARIC MEDICINE CENTER       Patient ID: Amanda Orr is a 89 y.o. female Date of Birth 5/7/1926     Location of Service: [facility]    Performed wound round with: Wound team     Chief Complaint : Multiple wounds    Wound Instructions:  Wound:  Sacrum  Discontinue previous wound order  Cleanse the wound bed with NSS   Apply non-sting skin prep to periwound area  Apply medihoney to wound bed, then cover with bordered foam  Frequency : daily and prn for soiling    Wound :  right lateral lower leg  Cleanse with normal saline solution or wound cleanser  Apply Skin-Prep to periwound area  Apply calcium alginate to wound bed and cover with ABD pad and wrap with rolled gauze  Three times a week and as needed for soiling    Wound: Left lower leg anterior,    Cleanse with normal saline solution or wound cleanser  Apply Betadine to wound bed, GRETA  Daily and as needed for soiling    Pressure relief cushion for the recliner chair    Offload all wounds  Turn and reposition frequently,   Increase protein intake.  Monitor for any sign of infection or worsening, inform PCP or patient's primary physician in your facility.      Allergies  Patient has no allergy information on record.      Assessment & Plan:  1. Ambulatory dysfunction  Assessment & Plan:  On 24/7 restorative care      2. Degloving injury  Assessment & Plan:  Left lower leg anterior  Wound is stable  Continue Betadine        3. Traumatic injury to skin or subcutaneous tissue  Assessment & Plan:  Wound on the left lateral lower leg is healed  Right lateral calf: stable, with slough, with no obvious sign of infection.  Local wound care with calcium alginate  Continue to offload  Follow-up next week      4. Pressure injury of sacral region, unstageable (HCC)  Assessment & Plan:  Sacrum  Wound almost the same as last week, with 50% slough  Selective debridement performed  Continue ocal wound care to [employer]  Continue  to offload.   Pressure relief cushion on the recliner chair  Air mattress  Increase protein intake, under the care of RD  Follow-up next week    Orders:  -     Debridement    5. Hospice care patient  Assessment & Plan:  Hospice care plan was chosen. Based on this plan, there is no expectation of healing. The end goals of our palliative care plan are pain control, drainage management, prevention of infection, odor control, and optimization of quality of life insofar as the wound will allow. Therefore, invasive procedures related to the wound will be minimized. Dressings will be chosen to achieve the palliative care plan goals rather that to achieve healing of the wound.  Debridements may be performed periodically in order to prevent infection or control odor, etc.  Visits will be scheduled accordingly to minimize disruption of quality of life while allowing appropriate provider oversight of the wound and any dramatic changes that might occur.                          Subjective:   1/9/2024This is a 89 y.o., female referred to our service for wound/ skin alterations on bilateral lower legs and sacrum.Patient have a complex medical history including but not limited to closed fracture of left hip and hypokalemia. Patient was referred by Senior Care Team. Patient was seen in collaboration with the facility wound team.     Wound History: Patient is a poor historian and was not able to provide information related to the wound.  It is not clear where the wound on the bilateral lower legs came from.  Patient previously under the care of hospice.  The wound on the sacrum is pressure ulcer.    Received patient on recliner chair, seems comfortable.  Patient needs assistance with turning and repositioning in bed.  Patient is incontinent of both bowel and bladder.    1/17/2024 Follow up for multiple wounds . Received patient, not in distress. Facility staff did not report any significant issues related to the wound. Denies pain.      1/23/2024 Follow up for wound on the multiple wounds . Received patient, not in distress. Facility staff did not report any significant issues related to the wound. Treatment removed on the left lower leg anterior wound - xerofoam.     1/30/2024 Follow up for wound on the BLE and sacrum . Received patient, not in distress. Facility staff did not report any significant issues related to the wound. Denies pain. Dressing removed bordered foam on BLE.    2/6/2024 Follow up for wound on the BLE and sacrum . Received patient, not in distress. Facility staff did not report any significant issues related to the wound. Patient had been spending all her time in recliner chair.     2/13/2024 Follow up for wound on the BLE and sacrum . Received patient, not in distress. Facility staff did not report any significant issues related to the wound. Denies pain. Patient currently under the care of [facility].                       Review of Systems   Constitutional: Negative.    Respiratory: Negative.     Cardiovascular: Negative.    Musculoskeletal:  Positive for gait problem.   Skin:  Positive for wound.   Psychiatric/Behavioral: Negative.         Objective:    Physical Exam  Constitutional:       Comments: Seems frail   Cardiovascular:      Rate and Rhythm: Normal rate.   Pulmonary:      Effort: Pulmonary effort is normal.   Musculoskeletal:      Comments: LROM   Skin:     Findings: Lesion present.      Comments: Left lower leg anterior: Wound size is 4.2 x 0.6 cm.,  100% eschar, no drainage, no obvious sign of infection    Left lateral calf: healed    Right lower leg: Wound is healed    Right lateral calf: Wound size is 2 x  1 x 0.1 cm.,  50% slough, 50% granulation, small amount of serous drainage, with no obvious sign of infection    Sacrum: Wound size is 2 x 2.7 x 0.2 cm.,  50% slough, 50% granulation, small amount of serous drainage, periwound is normal, with no obvious sign of infection              Debridement   "  Universal Protocol:  Consent: Verbal consent obtained.  Risks and benefits: risks, benefits and alternatives were discussed  Consent given by: patient  Time out: Immediately prior to procedure a \"time out\" was called to verify the correct patient, procedure, equipment, support staff and site/side marked as required.  Timeout called at: 2/13/2024 10:34 AM.  Patient understanding: patient states understanding of the procedure being performed  Patient identity confirmed: verbally with patient    Debridement Details  Performed by: NP (sacrum)  Debridement type: selective  Pain control: lidocaine 4%      Post-debridement measurements  Length (cm): 2  Width (cm): 2.7  Depth (cm): 0.2  Percent debrided: 100%  Surface Area (cm^2): 5.4  Area Debrided (cm^2): 5.4  Volume (cm^3): 1.08    Devitalized tissue debrided: biofilm, fibrin and slough  Instrument(s) utilized: curette  Bleeding: small  Hemostasis obtained with: pressure  Procedural pain (0-10): 0  Post-procedural pain: 0   Response to treatment: procedure was tolerated well               Patient's care was coordinated with nursing facility staff. Recent vitals, labs and updated medications were reviewed on EMR or chart system of facility. Past Medical, surgical, social, medication and allergy history and patient's previous records were reviewed and updated as appropriate: Most up-to date information is available in the facility EMR where the patient is currently admitted.    Patient Active Problem List   Diagnosis    Chronic diastolic heart failure (HCC)    Palliative care patient    Paroxysmal atrial fibrillation (HCC)    Primary hypertension    Stage 3a chronic kidney disease (CKD) ([facility])    Closed fracture of left hip (HCC)    Pressure injury of sacral region, stage 3 (HCC)    Hypokalemia    CKD (chronic kidney disease), stage IV (HCC)    Debility    Acquired hypothyroidism    Degloving injury    Ambulatory dysfunction    Traumatic injury to skin or subcutaneous " tissue    Severe protein-calorie malnutrition (HCC)    Counseling regarding advance care planning and goals of care    Shortness of breath    Pressure injury of sacral region, unstageable (HCC)    Hospice care patient     History reviewed. No pertinent past medical history.  History reviewed. No pertinent surgical history.  Social History     Socioeconomic History    Marital status: Single     Spouse name: None    Number of children: None    Years of education: None    Highest education level: None   Occupational History    None   Tobacco Use    Smoking status: Former     Types: Cigarettes    Smokeless tobacco: Never   Substance and Sexual Activity    Alcohol use: None    Drug use: None    Sexual activity: None   Other Topics Concern    None   Social History Narrative    None     Social Determinants of Health     Financial Resource Strain: Low Risk  (12/24/2023)    Received from [facility]    Overall Financial Resource Strain (CARDIA)     Difficulty of Paying Living Expenses: Not very hard   Food Insecurity: No Food Insecurity (12/24/2023)    Received from [facility]    Hunger Vital Sign     Worried About Running Out of Food in the Last Year: Never true     Ran Out of Food in the Last Year: Never true   Transportation Needs: No Transportation Needs (12/24/2023)    Received from [facility]    PRAPARE - Transportation     Lack of Transportation (Medical): No     Lack of Transportation (Non-Medical): No   Physical Activity: Not on file   Stress: Not on file   Social Connections: Not on file   Intimate Partner Violence: Not At Risk (12/24/2023)    Received from [facility]    Humiliation, Afraid, Rape, and Kick questionnaire     Fear of Current or Ex-Partner: No     Emotionally Abused: No     Physically Abused: No     Sexually Abused: No   Housing Stability: Low Risk  (12/24/2023)    Received from [facility]    Housing Stability  "Vital Sign     Unable to Pay for Housing in the Last Year: No     Number of Places Lived in the Last Year: 1     Unstable Housing in the Last Year: No        Current Outpatient Medications:     acetaminophen (TYLENOL) 325 mg tablet, Take 975 mg by mouth 3 (three) times a day. Morning, afternoon and evening doses  Indications: Pain, Disp: , Rfl:     apixaban (ELIQUIS) 2.5 mg, Take 2.5 mg by mouth 2 (two) times a day. Indications: Thromboembolism secondary to Atrial Fibrillation, Disp: , Rfl:     bisacodyl (Bisacodyl Laxative) 10 mg suppository, Insert 10 mg into the rectum daily as needed for constipation. Indications: Constipation, Disp: , Rfl:     furosemide (LASIX) 20 mg tablet, Take 20 mg by mouth daily. Indications: Cardiac Failure, Disp: , Rfl:     levothyroxine 88 mcg tablet, Take 88 mcg by mouth daily. Indications: Underactive Thyroid, Disp: , Rfl:     LORazepam (ATIVAN) 0.5 mg tablet, Take 0.5 mg by mouth every 4 (four) hours as needed for anxiety (anxiety/insomnia/dyspnea), Disp: , Rfl:     oxyCODONE (ROXICODONE) 5 immediate release tablet, Take 5 mg by mouth every 4 (four) hours as needed (pain/dyspnea), Disp: , Rfl:     polyethylene glycol (GLYCOLAX) 17 GM/SCOOP powder, Take 17 g by mouth daily as needed (Constipation). Indications: Constipation, Disp: , Rfl:     zinc oxide (BALMEX) 11.3 % cream, Apply 1 Application topically daily as needed ([facility] care). Indications: Wound care, Disp: , Rfl:   History reviewed. No pertinent family history.           Coordination of Care: Wound team aware of the treatment plan. Facility nurse will provide wound treatment and monitor the wound for any changes.     Patient / Staff education : Patient / Staff was given education on sign of infection and pressure ulcer prevention. Patient/ Staff verbalized understanding     Follow up :  Next week    Voice-recognition software may have been used in the preparation of this document. Occasional wrong word or \"sound-alike\" " substitutions may have occurred due to the inherent limitations of voice recognition software. Interpretation should be guided by context.      YOAN Germain

## 2024-02-13 NOTE — ASSESSMENT & PLAN NOTE
Hospice care plan was chosen. Based on this plan, there is no expectation of healing. The end goals of our palliative care plan are pain control, drainage management, prevention of infection, odor control, and optimization of quality of life insofar as the wound will allow. Therefore, invasive procedures related to the wound will be minimized. Dressings will be chosen to achieve the palliative care plan goals rather that to achieve healing of the wound.  Debridements may be performed periodically in order to prevent infection or control odor, etc.  Visits will be scheduled accordingly to minimize disruption of quality of life while allowing appropriate provider oversight of the wound and any dramatic changes that might occur.

## 2024-02-13 NOTE — LETTER
Patient:  Amanda Orr   5/7/1926           YOAN Germain saw Amanda Orr for a wound care visit on 2/13/2024. See below for information relating to this visit.      Chief Complaint   Patient presents with    Follow Up Wound Care Visit        Assessment/Plan:  1. Ambulatory dysfunction  Assessment & Plan:  On 24/7 restorative care      2. Degloving injury  Assessment & Plan:  Left lower leg anterior  Wound is stable  Continue Betadine        3. Traumatic injury to skin or subcutaneous tissue  Assessment & Plan:  Wound on the left lateral lower leg is healed  Right lateral calf: stable, with slough, with no obvious sign of infection.  Local wound care with calcium alginate  Continue to offload  Follow-up next week      4. Pressure injury of sacral region, unstageable (HCC)  Assessment & Plan:  Sacrum  Wound almost the same as last week, with 50% slough  Selective debridement performed  Continue ocal wound care to [facility]  Continue to offload.   Pressure relief cushion on the recliner chair  Air mattress  Increase protein intake, under the care of RD  Follow-up next week    Orders:  -     Debridement    5. Hospice care patient  Assessment & Plan:  Hospice care plan was chosen. Based on this plan, there is no expectation of healing. The end goals of our palliative care plan are pain control, drainage management, prevention of infection, odor control, and optimization of quality of life insofar as the wound will allow. Therefore, invasive procedures related to the wound will be minimized. Dressings will be chosen to achieve the palliative care plan goals rather that to achieve healing of the wound.  Debridements may be performed periodically in order to prevent infection or control odor, etc.  Visits will be scheduled accordingly to minimize disruption of quality of life while allowing appropriate provider oversight of the wound and any dramatic changes that might occur.              Orders:  Amanda  Kirby  5/7/1926  Orders Placed This Encounter   Procedures    Debridement     This order was created via procedure documentation     Wound:  Sacrum  Discontinue previous wound order  Cleanse the wound bed with NSS   Apply non-sting skin prep to periwound area  Apply medihoney to wound bed, then cover with bordered foam  Frequency : daily and prn for soiling    Wound :  right lateral lower leg  Cleanse with normal saline solution or wound cleanser  Apply Skin-Prep to periwound area  Apply calcium alginate to wound bed and cover with ABD pad and wrap with rolled gauze  Three times a week and as needed for soiling    Wound: Left lower leg anterior,    Cleanse with normal saline solution or wound cleanser  Apply Betadine to wound bed, GRETA  Daily and as needed for soiling    Pressure relief cushion for the recliner chair    Offload all wounds  Turn and reposition frequently,   Increase protein intake.  Monitor for any sign of infection or worsening, inform PCP or patient's primary physician in your facility.      Follow Up:  Return in about 1 week (around 2/20/2024).       [facility] hours are 8:00 am - 4:30 pm Monday through Friday. The center phone number is [phone]. You can also contact me directly thru my email at [email or web address] or thru tiger text. If it is an emergency, please contact the PCP or patient's attending physician in your facility.     Sincerely,    Electronically signed by YOAN Germain    Patient : Amanda Orr    5/7/1926

## 2024-02-14 ENCOUNTER — HOME CARE VISIT (OUTPATIENT)
Dept: HOME HOSPICE | Facility: HOSPICE | Age: 89
End: 2024-02-14
Payer: MEDICARE

## 2024-02-14 ENCOUNTER — HOME CARE VISIT (OUTPATIENT)
Dept: HOME HEALTH SERVICES | Facility: HOME HEALTHCARE | Age: 89
End: 2024-02-14
Payer: MEDICARE

## 2024-02-14 PROCEDURE — G0156 HHCP-SVS OF AIDE,EA 15 MIN: HCPCS

## 2024-02-16 ENCOUNTER — HOME CARE VISIT (OUTPATIENT)
Dept: HOME HEALTH SERVICES | Facility: HOME HEALTHCARE | Age: 89
End: 2024-02-16
Payer: MEDICARE

## 2024-02-16 PROCEDURE — G0299 HHS/HOSPICE OF RN EA 15 MIN: HCPCS

## 2024-02-16 PROCEDURE — G0156 HHCP-SVS OF AIDE,EA 15 MIN: HCPCS

## 2024-02-19 ENCOUNTER — HOME CARE VISIT (OUTPATIENT)
Dept: HOME HEALTH SERVICES | Facility: HOME HEALTHCARE | Age: 89
End: 2024-02-19
Payer: MEDICARE

## 2024-02-19 ENCOUNTER — HOME CARE VISIT (OUTPATIENT)
Dept: HOME HOSPICE | Facility: HOSPICE | Age: 89
End: 2024-02-19
Payer: MEDICARE

## 2024-02-19 VITALS
SYSTOLIC BLOOD PRESSURE: 92 MMHG | DIASTOLIC BLOOD PRESSURE: 50 MMHG | TEMPERATURE: 97.7 F | HEART RATE: 90 BPM | RESPIRATION RATE: 22 BRPM

## 2024-02-19 DIAGNOSIS — Z51.5 HOSPICE CARE PATIENT: Primary | ICD-10-CM

## 2024-02-19 PROCEDURE — G0156 HHCP-SVS OF AIDE,EA 15 MIN: HCPCS

## 2024-02-19 PROCEDURE — G0299 HHS/HOSPICE OF RN EA 15 MIN: HCPCS

## 2024-02-19 RX ORDER — TRAMADOL HYDROCHLORIDE 50 MG/1
50 TABLET ORAL EVERY 6 HOURS PRN
Qty: 30 TABLET | Refills: 0 | Status: SHIPPED | OUTPATIENT
Start: 2024-02-19 | End: 2024-02-26

## 2024-02-20 ENCOUNTER — NURSING HOME VISIT (OUTPATIENT)
Dept: GERIATRICS | Facility: OTHER | Age: 89
End: 2024-02-20
Payer: COMMERCIAL

## 2024-02-20 ENCOUNTER — NURSING HOME VISIT (OUTPATIENT)
Dept: WOUND CARE | Facility: HOSPITAL | Age: 89
End: 2024-02-20
Payer: COMMERCIAL

## 2024-02-20 DIAGNOSIS — E43 SEVERE PROTEIN-CALORIE MALNUTRITION (HCC): ICD-10-CM

## 2024-02-20 DIAGNOSIS — N18.4 CKD (CHRONIC KIDNEY DISEASE), STAGE IV (HCC): ICD-10-CM

## 2024-02-20 DIAGNOSIS — Z51.5 HOSPICE CARE PATIENT: ICD-10-CM

## 2024-02-20 DIAGNOSIS — R26.2 AMBULATORY DYSFUNCTION: Primary | ICD-10-CM

## 2024-02-20 DIAGNOSIS — L89.150 PRESSURE INJURY OF SACRAL REGION, UNSTAGEABLE (HCC): ICD-10-CM

## 2024-02-20 DIAGNOSIS — I48.0 PAROXYSMAL ATRIAL FIBRILLATION (HCC): ICD-10-CM

## 2024-02-20 DIAGNOSIS — T14.8XXA DEGLOVING INJURY: ICD-10-CM

## 2024-02-20 DIAGNOSIS — I10 PRIMARY HYPERTENSION: ICD-10-CM

## 2024-02-20 DIAGNOSIS — T14.8XXA TRAUMATIC INJURY TO SKIN OR SUBCUTANEOUS TISSUE: ICD-10-CM

## 2024-02-20 DIAGNOSIS — E03.9 ACQUIRED HYPOTHYROIDISM: ICD-10-CM

## 2024-02-20 DIAGNOSIS — S72.002D CLOSED FRACTURE OF LEFT HIP WITH ROUTINE HEALING, SUBSEQUENT ENCOUNTER: ICD-10-CM

## 2024-02-20 DIAGNOSIS — I50.32 CHRONIC DIASTOLIC HEART FAILURE (HCC): Primary | ICD-10-CM

## 2024-02-20 PROCEDURE — 99309 SBSQ NF CARE MODERATE MDM 30: CPT | Performed by: FAMILY MEDICINE

## 2024-02-20 PROCEDURE — 99308 SBSQ NF CARE LOW MDM 20: CPT | Performed by: NURSE PRACTITIONER

## 2024-02-20 NOTE — PROGRESS NOTES
[facility] WOUND CARE MANAGEMENT   AND HYPERBARIC MEDICINE CENTER       Patient ID: Amanda Orr is a 89 y.o. female Date of Birth 5/7/1926     Location of Service: [facility]    Performed wound round with: Wound team     Chief Complaint : Multiple wounds    Wound Instructions:  Wound:  Sacrum  Discontinue previous wound order  Cleanse the wound bed with NSS   Apply non-sting skin prep to periwound area  Apply santyl collagenase to wound bed, then cover with bordered foam  Frequency : daily and prn for soiling    Wound :  right lateral lower leg  Cleanse with normal saline solution or wound cleanser  Apply Skin-Prep to periwound area  Apply betadine to wound bed and cover with ABD pad and bordered foam  Daily and as needed for soiling    Wound: Left lower leg anterior,    Cleanse with normal saline solution or wound cleanser  Apply Betadine to wound bed, GRETA  Daily and as needed for soiling    Pressure relief cushion for the recliner chair    Offload all wounds  Turn and reposition frequently,   Increase protein intake.  Monitor for any sign of infection or worsening, inform PCP or patient's primary physician in your facility.      Allergies  Patient has no allergy information on record.      Assessment & Plan:  1. Ambulatory dysfunction  Assessment & Plan:  On 24/7 restorative care      2. Degloving injury  Assessment & Plan:  Left lower leg anterior  Wound is stable, cover with eschar  Continue Betadine        3. Traumatic injury to skin or subcutaneous tissue  Assessment & Plan:  Right lateral calf: improved, 100%B granulation  Local wound care - change to betadine  Continue to offload  Follow-up next week      4. Pressure injury of sacral region, unstageable (HCC)  Assessment & Plan:  Sacrum  Increased necrotic tissue  Change local wound care to santyl  Continue to offload.   Pressure relief cushion on the recliner chair  Air mattress  Increase protein intake, under the care of RD  Follow-up next  week      5. Hospice care patient  Assessment & Plan:  Hospice care plan was chosen. Based on this plan, there is no expectation of healing. The end goals of our palliative care plan are pain control, drainage management, prevention of infection, odor control, and optimization of quality of life insofar as the wound will allow. Therefore, invasive procedures related to the wound will be minimized. Dressings will be chosen to achieve the palliative care plan goals rather that to achieve healing of the wound.  Debridements may be performed periodically in order to prevent infection or control odor, etc.  Visits will be scheduled accordingly to minimize disruption of quality of life while allowing appropriate provider oversight of the wound and any dramatic changes that might occur.                            Subjective:   1/9/2024This is a 89 y.o., female referred to our service for wound/ skin alterations on bilateral lower legs and sacrum.Patient have a complex medical history including but not limited to closed fracture of left hip and hypokalemia. Patient was referred by Senior Care Team. Patient was seen in collaboration with the facility wound team.     Wound History: Patient is a poor historian and was not able to provide information related to the wound.  It is not clear where the wound on the bilateral lower legs came from.  Patient previously under the care of hospice.  The wound on the sacrum is pressure ulcer.    Received patient on recliner chair, seems comfortable.  Patient needs assistance with turning and repositioning in bed.  Patient is incontinent of both bowel and bladder.    1/17/2024 Follow up for multiple wounds . Received patient, not in distress. Facility staff did not report any significant issues related to the wound. Denies pain.     1/23/2024 Follow up for wound on the multiple wounds . Received patient, not in distress. Facility staff did not report any significant issues related to the  wound. Treatment removed on the left lower leg anterior wound - xerofoam.     1/30/2024 Follow up for wound on the BLE and sacrum . Received patient, not in distress. Facility staff did not report any significant issues related to the wound. Denies pain. Dressing removed bordered foam on BLE.    2/6/2024 Follow up for wound on the BLE and sacrum . Received patient, not in distress. Facility staff did not report any significant issues related to the wound. Patient had been spending all her time in recliner chair.     2/13/2024 Follow up for wound on the BLE and sacrum . Received patient, not in distress. Facility staff did not report any significant issues related to the wound. Denies pain. Patient currently under the care of [facility] hospice.     2/20/2024 Follow up for wound on the BLE and sacrum . Received patient, not in distress. Facility staff did not report any significant issues related to the wound. Currently under the care of hospice. Denies pain.                         Review of Systems   Constitutional: Negative.    Respiratory: Negative.     Cardiovascular: Negative.    Musculoskeletal:  Positive for gait problem.   Skin:  Positive for wound.   Psychiatric/Behavioral: Negative.         Objective:    Physical Exam  Constitutional:       Comments: Seems frail   Cardiovascular:      Rate and Rhythm: Normal rate.   Pulmonary:      Effort: Pulmonary effort is normal.   Musculoskeletal:      Comments: LROM   Skin:     Findings: Lesion present.      Comments: Left lower leg anterior: Wound size is 4 x 0.6 cm.,  100% eschar, no drainage, no obvious sign of infection    Left lateral calf: healed    Right lower leg: Wound is healed    Right lateral calf: Wound size is 1.5 x  1 x 0.1 cm.,  100% granulation,no drainage, with no obvious sign of infection    Sacrum: Wound size is 1.5 x 3 x 0.1 cm.,  100% slough, small amount of serous drainage, periwound is normal, with no obvious sign of infection               Procedures           Patient's care was coordinated with nursing facility staff. Recent vitals, labs and updated medications were reviewed on EMR or chart system of facility. Past Medical, surgical, social, medication and allergy history and patient's previous records were reviewed and updated as appropriate: Most up-to date information is available in the facility EMR where the patient is currently admitted.    Patient Active Problem List   Diagnosis    Chronic diastolic heart failure ([facility])    Palliative care patient    Paroxysmal atrial fibrillation ([facility])    Primary hypertension    Stage 3a chronic kidney disease (CKD) ([facility])    Closed fracture of left hip ([facility])    Pressure injury of sacral region, stage 3 ([facility])    Hypokalemia    CKD (chronic kidney disease), stage IV ([facility])    Debility    Acquired hypothyroidism    Degloving injury    Ambulatory dysfunction    Traumatic injury to skin or subcutaneous tissue    Severe protein-calorie malnutrition ([facility])    Counseling regarding advance care planning and goals of care    Shortness of breath    Pressure injury of sacral region, unstageable ([facility])    Hospice care patient     History reviewed. No pertinent past medical history.  History reviewed. No pertinent surgical history.  Social History     Socioeconomic History    Marital status: Single     Spouse name: None    Number of children: None    Years of education: None    Highest education level: None   Occupational History    None   Tobacco Use    Smoking status: Former     Types: Cigarettes    Smokeless tobacco: Never   Substance and Sexual Activity    Alcohol use: None    Drug use: None    Sexual activity: None   Other Topics Concern    None   Social History Narrative    None     Social Determinants of Health     Financial Resource Strain: Low Risk  (12/24/2023)    Received from [facility]    Overall Financial Resource Strain (CARDIA)     Difficulty of Paying Living Expenses: Not very hard   Food  Insecurity: No Food Insecurity (12/24/2023)    Received from [facility]    Hunger Vital Sign     Worried About Running Out of Food in the Last Year: Never true     Ran Out of Food in the Last Year: Never true   Transportation Needs: No Transportation Needs (12/24/2023)    Received from [facility]    PRAPARE - Transportation     Lack of Transportation (Medical): No     Lack of Transportation (Non-Medical): No   Physical Activity: Not on file   Stress: Not on file   Social Connections: Not on file   Intimate Partner Violence: Not At Risk (12/24/2023)    Received from [facility]    Humiliation, Afraid, Rape, and Kick questionnaire     Fear of Current or Ex-Partner: No     Emotionally Abused: No     Physically Abused: No     Sexually Abused: No   Housing Stability: Low Risk  (12/24/2023)    Received from [facility]    Housing Stability Vital Sign     Unable to Pay for Housing in the Last Year: No     Number of Places Lived in the Last Year: 1     Unstable Housing in the Last Year: No        Current Outpatient Medications:     acetaminophen (TYLENOL) 325 mg tablet, Take 975 mg by mouth 3 (three) times a day. Morning, afternoon and evening doses  Indications: Pain, Disp: , Rfl:     apixaban (ELIQUIS) 2.5 mg, Take 2.5 mg by mouth 2 (two) times a day. Indications: Thromboembolism secondary to Atrial Fibrillation, Disp: , Rfl:     bisacodyl (Bisacodyl Laxative) 10 mg suppository, Insert 10 mg into the rectum daily as needed for constipation. Indications: Constipation, Disp: , Rfl:     furosemide (LASIX) 20 mg tablet, Take 20 mg by mouth daily. Indications: Cardiac Failure, Disp: , Rfl:     levothyroxine 88 mcg tablet, Take 88 mcg by mouth daily. Indications: Underactive Thyroid, Disp: , Rfl:     LORazepam (ATIVAN) 0.5 mg tablet, Take 0.5 mg by mouth every 4 (four) hours as needed for anxiety (anxiety/insomnia/dyspnea), Disp: , Rfl:     oxygen gas, Inhale  "2 L/min continuous. Indications: SOB, Disp: , Rfl:     polyethylene glycol (GLYCOLAX) 17 GM/SCOOP powder, Take 17 g by mouth daily as needed (Constipation). Indications: Constipation, Disp: , Rfl:     traMADol (ULTRAM) 50 mg tablet, Take 1 tablet (50 mg total) by mouth every 6 (six) hours as needed (pain/dyspnea), Disp: 30 tablet, Rfl: 0    traMADol (ULTRAM) 50 mg tablet, Take 50 mg by mouth every 6 (six) hours as needed for moderate pain or severe pain. Indications: Pain, Disp: , Rfl:     zinc oxide (BALMEX) 11.3 % cream, Apply 1 Application topically daily as needed ([place]ounn care), Disp: 340 g, Rfl: 0  History reviewed. No pertinent family history.           Coordination of Care: Wound team aware of the treatment plan. Facility nurse will provide wound treatment and monitor the wound for any changes.     Patient / Staff education : Patient / Staff was given education on sign of infection and pressure ulcer prevention. Patient/ Staff verbalized understanding     Follow up :  PRN    Voice-recognition software may have been used in the preparation of this document. Occasional wrong word or \"sound-alike\" substitutions may have occurred due to the inherent limitations of voice recognition software. Interpretation should be guided by context.      YOAN Germain  "

## 2024-02-20 NOTE — PROGRESS NOTES
[facility]  [address] [address]  [phone]  [facility]  [id number]    Hospice Care patient    NAME: Amanda Orr  AGE: 89 y.o. SEX: female [phone]    DATE OF ENCOUNTER: 2/20/2024    Assessment and Plan     Diagnoses and all orders for this visit:    Chronic diastolic heart failure (HCC)    Acquired hypothyroidism    Paroxysmal atrial fibrillation (HCC)    Primary hypertension    Closed fracture of left hip with routine healing, subsequent encounter    CKD (chronic kidney disease), stage IV (HCC)    Hospice care patient    Severe protein-calorie malnutrition (HCC)    Pressure injury of sacral region, unstageable (HCC)    Continue levothyroxine 88 mcg daily for hypothyroidism, Lasix 20 mg daily for CHF.  Continue comfort measures with hospice care, lorazepam 0.5 mg as needed every 4 hours, tramadol 50 mg as needed 4 times daily, oxycodone 5 mg as needed every 4 hours.  Bowel regimen in place.    Chief Complaint     Routine Long term follow-up visit.    History of Present Illness     HPI  The patient is seen in her room during routine LTC visit. She c/o pain in her back.  Denies nausea, vomiting.    The following portions of the patient's history were reviewed and updated as appropriate: allergies, current medications, past family history, past medical history, past social history, past surgical history and problem list.    Review of Systems     Review of Systems   Constitutional:  Positive for fatigue.   Musculoskeletal:  Positive for arthralgias, back pain and gait problem.     Active Problem List     Patient Active Problem List   Diagnosis    Chronic diastolic heart failure (HCC)    Palliative care patient    Paroxysmal atrial fibrillation (HCC)    Primary hypertension    Stage 3a chronic kidney disease (CKD) (HCC)    Closed fracture of left hip (HCC)    Pressure injury of sacral region, stage 3 (HCC)    Hypokalemia    CKD (chronic kidney disease), stage IV (HCC)    Debility     Acquired hypothyroidism    Degloving injury    Ambulatory dysfunction    Traumatic injury to skin or subcutaneous tissue    Severe protein-calorie malnutrition (HCC)    Counseling regarding advance care planning and goals of care    Shortness of breath    Pressure injury of sacral region, unstageable (HCC)    Hospice care patient       Objective     Vitals: EHR at facilty reviewed. Stable with /70, HR 92, RR 20, SpO2 94%. Wt 71.2 lb, Temp 98.1    Physical Exam  Vitals and nursing note reviewed.   Constitutional:       General: She is not in acute distress.     Appearance: She is ill-appearing.      Comments: cachectic   HENT:      Head: Normocephalic and atraumatic.      Nose: Nose normal.      Mouth/Throat:      Mouth: Mucous membranes are moist.   Eyes:      General:         Right eye: No discharge.         Left eye: No discharge.      Conjunctiva/sclera: Conjunctivae normal.   Cardiovascular:      Rate and Rhythm: Normal rate and regular rhythm.   Pulmonary:      Effort: Pulmonary effort is normal.      Comments: Diminished breath sound bilaterally  Abdominal:      General: Bowel sounds are normal.      Palpations: Abdomen is soft.   Skin:     General: Skin is warm.   Neurological:      Mental Status: She is alert.       Pertinent Laboratory/Diagnostic Studies:  Refer to facility chart.    Current Medications   Medications reviewed and updated in facility chart.

## 2024-02-20 NOTE — ASSESSMENT & PLAN NOTE
Right lateral calf: improved, 100%B granulation  Local wound care - change to betadine  Continue to offload  Follow-up next week

## 2024-02-20 NOTE — LETTER
Patient:  Amanda Orr   5/7/1926           YOAN Germain saw Amanda Orr for a wound care visit on 2/20/2024. See below for information relating to this visit.      Chief Complaint   Patient presents with    Follow Up Wound Care Visit        Assessment/Plan:  1. Ambulatory dysfunction  Assessment & Plan:  On 24/7 restorative care      2. Degloving injury  Assessment & Plan:  Left lower leg anterior  Wound is stable, cover with eschar  Continue Betadine        3. Traumatic injury to skin or subcutaneous tissue  Assessment & Plan:  Right lateral calf: improved, 100%B granulation  Local wound care - change to betadine  Continue to offload  Follow-up next week      4. Pressure injury of sacral region, unstageable (HCC)  Assessment & Plan:  Sacrum  Increased necrotic tissue  Change local wound care to santyl  Continue to offload.   Pressure relief cushion on the recliner chair  Air mattress  Increase protein intake, under the care of RD  Follow-up next week      5. Hospice care patient  Assessment & Plan:  Hospice care plan was chosen. Based on this plan, there is no expectation of healing. The end goals of our palliative care plan are pain control, drainage management, prevention of infection, odor control, and optimization of quality of life insofar as the wound will allow. Therefore, invasive procedures related to the wound will be minimized. Dressings will be chosen to achieve the palliative care plan goals rather that to achieve healing of the wound.  Debridements may be performed periodically in order to prevent infection or control odor, etc.  Visits will be scheduled accordingly to minimize disruption of quality of life while allowing appropriate provider oversight of the wound and any dramatic changes that might occur.              Orders:  Amanda Orr  5/7/1926    Wound:  Sacrum  Discontinue previous wound order  Cleanse the wound bed with NSS   Apply non-sting skin prep to periwound  area  Apply santyl collagenase to wound bed, then cover with bordered foam  Frequency : daily and prn for soiling    Wound :  right lateral lower leg  Cleanse with normal saline solution or wound cleanser  Apply Skin-Prep to periwound area  Apply betadine to wound bed and cover with ABD pad and bordered foam  Daily and as needed for soiling    Wound: Left lower leg anterior,    Cleanse with normal saline solution or wound cleanser  Apply Betadine to wound bed, GRETA  Daily and as needed for soiling    Pressure relief cushion for the recliner chair    Offload all wounds  Turn and reposition frequently,   Increase protein intake.  Monitor for any sign of infection or worsening, inform PCP or patient's primary physician in your facility.    Follow Up:  Return if symptoms worsen or fail to improve.       [facility] hours are 8:00 am - 4:30 pm Monday through Friday. The center phone number is [phone]. You can also contact me directly thru my email at [email or web address] or thru tiger text. If it is an emergency, please contact the PCP or patient's attending physician in your facility.     Sincerely,    Electronically signed by YOAN Germain    Patient : Amanda Orr    5/7/1926

## 2024-02-20 NOTE — ASSESSMENT & PLAN NOTE
Sacrum  Increased necrotic tissue  Change local wound care to RUSTyl  Continue to offload.   Pressure relief cushion on the recliner chair  Air mattress  Increase protein intake, under the care of RD  Follow-up next week

## 2024-02-21 ENCOUNTER — HOME CARE VISIT (OUTPATIENT)
Dept: HOME HEALTH SERVICES | Facility: HOME HEALTHCARE | Age: 89
End: 2024-02-21
Payer: MEDICARE

## 2024-02-21 PROCEDURE — G0156 HHCP-SVS OF AIDE,EA 15 MIN: HCPCS

## 2024-02-23 ENCOUNTER — HOME CARE VISIT (OUTPATIENT)
Dept: HOME HEALTH SERVICES | Facility: HOME HEALTHCARE | Age: 89
End: 2024-02-23
Payer: MEDICARE

## 2024-02-23 PROCEDURE — G0156 HHCP-SVS OF AIDE,EA 15 MIN: HCPCS

## 2024-02-26 PROBLEM — J96.21 ACUTE ON CHRONIC RESPIRATORY FAILURE WITH HYPOXIA (HCC): Status: ACTIVE | Noted: 2024-01-01

## 2024-02-26 NOTE — PROGRESS NOTES
Facility: [facility]  POS: 32   Hospice care patient  Acute Med Note  Code status: DNR    Assessment/Plan:  89-year-old female with:    Acute on chronic respiratory failure with hypoxia (HCC)  With increased labored respirations reported by nursing staff  Patient with labored respirations and tachypneic  Maintained on oxygen via nasal cannula  Morphine sulfate liquid 5 mg ordered every 2 hours as needed for shortness of breath, labored respirations, or pain as not to delay treatment  Informed charge nurse to notify hospice    Hospice care patient  Patient is currently under [facility] access hospital care  With progressive decline  Patient is extremely weak and having difficulty swallowing  Morphine sulfate PRN ordered    Subjective: Shortness of breath, severe bilateral shoulder pain and back pain     Patient ID: Amanda Orr is a 89 y.o. female.    89-year-old female seen and examined at the request of nursing staff for concern of change in respiratory status.  Informed charge nurse to notify hospice regarding any change in patient's condition.  At time of examination this morning, patient found sleeping, but responded to verbal stimuli.  With labored respirations and tachypnea noted.  Oxygen maintained via nasal cannula.  She woke up and reports that she had severe bilateral shoulder pain and back pain and was told that it was too soon for her to have her pain medications.  Will discontinue tramadol and oxycodone in order morphine sulfate liquid every 2 hours as needed.    The following portions of the patient's history were reviewed and updated as appropriate:  Allergies, current medications, Past Family history, past medical history, past social history, past surgical history, and problem list.    Review of Systems   Constitutional:  Positive for fatigue.   Respiratory:  Positive for shortness of breath.    Cardiovascular:  Negative for chest pain.   Gastrointestinal:  Negative for abdominal pain.    Musculoskeletal:  Positive for arthralgias (Bilateral shoulder pain) and back pain.         Objective:    There were no vitals taken for this visit.     Physical Exam  Constitutional:       General: She is not in acute distress.     Appearance: She is ill-appearing. She is not toxic-appearing or diaphoretic.      Comments: Cachectic   HENT:      Nose: No congestion.      Mouth/Throat:      Mouth: Mucous membranes are dry.   Eyes:      Conjunctiva/sclera: Conjunctivae normal.   Cardiovascular:      Rate and Rhythm: Normal rate.   Pulmonary:      Comments: Respirations labored and tachypneic.  With decreased breath sounds.  Musculoskeletal:      Right lower leg: No edema.      Left lower leg: No edema.   Skin:     Comments: Dressing noted to right lower extremity.   Neurological:      Motor: Weakness present.      Comments: Sleeping but woke up to verbal stimuli.  Was able to express her needs.   Psychiatric:         Behavior: Behavior normal.         Thought Content: Thought content normal.         I have spent a total time of 30 minutes on 02/26/24 in caring for this patient including Risks and benefits of tx options, Counseling / Coordination of care, Documenting in the medical record, Reviewing / ordering tests, medicine, procedures  , Obtaining or reviewing history  , and Communicating with other healthcare professionals .     This note was completed in part utilizing with Dragon medical one voice recognition software.  Grammatical errors, random word insertion, spelling mistakes, and incomplete sentences may be an occasional consequence of the system secondary to software limitations, ambient noise and hardware issues.  At the time of dictation, efforts were made to edit, clarify and/or correct errors.  Please read the chart carefully and recognize, using context, where substitutions have occurred.  If you have any questions or concerns about the context, text or information contained within the body of this  dictation, please contact myself, the provider, for further clarification.

## 2024-02-26 NOTE — ASSESSMENT & PLAN NOTE
With increased labored respirations reported by nursing staff  Patient with labored respirations and tachypneic  Maintained on oxygen via nasal cannula  Morphine sulfate liquid 5 mg ordered every 2 hours as needed for shortness of breath, labored respirations, or pain as not to delay treatment  Informed charge nurse to notify hospice

## 2024-02-26 NOTE — ASSESSMENT & PLAN NOTE
Patient is currently under [facility]'s hospice care  With progressive decline  Patient is extremely weak and having difficulty swallowing  Morphine sulfate PRN ordered

## 2024-02-27 NOTE — ASSESSMENT & PLAN NOTE
Sacrum  Wound is stable, with no obvious sign of infeciton  Continue local wound care to santyl  Continue to offload.   Pressure relief cushion on the recliner chair  Air mattress  Increase protein intake, under the care of RD  Goal of care : palliative

## 2024-02-27 NOTE — LETTER
Patient:  Amanda Orr   5/7/1926           YOAN Germain saw Amanda Orr for a wound care visit on 2/27/2024. See below for information relating to this visit.      Chief Complaint   Patient presents with    Follow Up Wound Care Visit        Assessment/Plan:  1. Ambulatory dysfunction  Assessment & Plan:  On 24/7 restorative care      2. Degloving injury  Assessment & Plan:  Left lower leg anterior  Wound is stable, cover with eschar  Leave GRETA  Continue to offload        3. Pressure injury of sacral region, unstageable (HCC)  Assessment & Plan:  Sacrum  Wound is stable, with no obvious sign of infeciton  Continue local wound care to santyl  Continue to offload.   Pressure relief cushion on the recliner chair  Air mattress  Increase protein intake, under the care of RD  Goal of care : palliative      4. Traumatic injury to skin or subcutaneous tissue  Assessment & Plan:  Right lateral calf: improved,  100% eschar  Leave GRETA  Continue to offload  Follow-up : two weeks      5. Hospice care patient  Assessment & Plan:  Hospice care plan was chosen. Based on this plan, there is no expectation of healing. The end goals of our palliative care plan are pain control, drainage management, prevention of infection, odor control, and optimization of quality of life insofar as the wound will allow. Therefore, invasive procedures related to the wound will be minimized. Dressings will be chosen to achieve the palliative care plan goals rather that to achieve healing of the wound.  Debridements may be performed periodically in order to prevent infection or control odor, etc.  Visits will be scheduled accordingly to minimize disruption of quality of life while allowing appropriate provider oversight of the wound and any dramatic changes that might occur.              Orders:  Amanda Orr  5/7/1926  Wound:  Sacrum  Discontinue previous wound order  Cleanse the wound bed with NSS   Apply non-sting skin prep to  periwound area  Apply santyl collagenase to wound bed, then cover with bordered foam  Frequency : daily and prn for soiling    Wound: Left lower leg anterior and right lower leg lateral   Leave GRETA  Pressure relief cushion for the recliner chair    Offload all wounds  Turn and reposition frequently,   Increase protein intake.  Monitor for any sign of infection or worsening, inform PCP or patient's primary physician in your facility.        Follow Up:  Return in about 2 weeks (around 3/12/2024).       [facility] hours are 8:00 am - 4:30 pm Monday through Friday. The center phone number is [phone]. You can also contact me directly thru my email at [email or web address] or thru [email or web address]. If it is an emergency, please contact the PCP or patient's attending physician in your facility.     Sincerely,    Electronically signed by YOAN Germain    Patient : Amanda Orr    5/7/1926

## 2024-02-27 NOTE — ASSESSMENT & PLAN NOTE
Right lateral calf: improved,  100% eschar  Leave GRETA  Continue to offload  Follow-up : two weeks

## 2024-02-27 NOTE — LETTER
Patient:  Amanda Orr   5/7/1926           YOAN Germain saw Amanda Orr for a wound care visit on 2/27/2024. See below for information relating to this visit.      Chief Complaint   Patient presents with    Follow Up Wound Care Visit        Assessment/Plan:  1. Ambulatory dysfunction  Assessment & Plan:  On 24/7 restorative care      2. Degloving injury  Assessment & Plan:  Left lower leg anterior  Wound is stable, cover with eschar  Leave GRETA  Continue to offload        3. Pressure injury of sacral region, unstageable (HCC)  Assessment & Plan:  Sacrum  Wound is stable, with no obvious sign of infeciton  Continue local wound care to santyl  Continue to offload.   Pressure relief cushion on the recliner chair  Air mattress  Increase protein intake, under the care of RD  Goal of care : palliative      4. Traumatic injury to skin or subcutaneous tissue  Assessment & Plan:  Right lateral calf: improved,  100% eschar  Leave GRETA  Continue to offload  Follow-up : two weeks      5. Hospice care patient  Assessment & Plan:  Hospice care plan was chosen. Based on this plan, there is no expectation of healing. The end goals of our palliative care plan are pain control, drainage management, prevention of infection, odor control, and optimization of quality of life insofar as the wound will allow. Therefore, invasive procedures related to the wound will be minimized. Dressings will be chosen to achieve the palliative care plan goals rather that to achieve healing of the wound.  Debridements may be performed periodically in order to prevent infection or control odor, etc.  Visits will be scheduled accordingly to minimize disruption of quality of life while allowing appropriate provider oversight of the wound and any dramatic changes that might occur.              Orders:  Amanda Orr  5/7/1926  Wound:  Sacrum  Discontinue previous wound order  Cleanse the wound bed with NSS   Apply non-sting skin prep to  periwound area  Apply santyl collagenase to wound bed, then cover with bordered foam  Frequency : daily and prn for soiling     Wound: Left lower leg anterior and right lower leg lateral   Leave GRETA  Pressure relief cushion for the recliner chair     Offload all wounds  Turn and reposition frequently,   Increase protein intake.  Monitor for any sign of infection or worsening, inform PCP or patient's primary physician in your facility.      Follow Up:  Return in about 2 weeks (around 3/12/2024).       [facility] hours are 8:00 am - 4:30 pm Monday through Friday. The center phone number is [phone]. You can also contact me directly thru my email at [email or web address] or thru tiger text. If it is an emergency, please contact the PCP or patient's attending physician in your facility.     Sincerely,    Electronically signed by YOAN Germain    Patient : Amanda Orr    5/7/1926

## 2024-02-27 NOTE — PROGRESS NOTES
[facility] WOUND CARE MANAGEMENT   AND HYPERBARIC MEDICINE CENTER       Patient ID: Amanda Orr is a 89 y.o. female Date of Birth 5/7/1926     Location of Service: [facility]    Performed wound round with: Wound team     Chief Complaint : Multiple wounds    Wound Instructions:  Wound:  Sacrum  Discontinue previous wound order  Cleanse the wound bed with NSS   Apply non-sting skin prep to periwound area  Apply santyl collagenase to wound bed, then cover with bordered foam  Frequency : daily and prn for soiling    Wound: Left lower leg anterior and right lower leg lateral   Leave GRETA  Pressure relief cushion for the recliner chair    Offload all wounds  Turn and reposition frequently,   Increase protein intake.  Monitor for any sign of infection or worsening, inform PCP or patient's primary physician in your facility.      Allergies  Patient has no allergy information on record.      Assessment & Plan:  1. Ambulatory dysfunction  Assessment & Plan:  On 24/7 restorative care      2. Degloving injury  Assessment & Plan:  Left lower leg anterior  Wound is stable, cover with eschar  Leave GRETA  Continue to offload        3. Pressure injury of sacral region, unstageable (HCC)  Assessment & Plan:  Sacrum  Wound is stable, with no obvious sign of infeciton  Continue local wound care to santyl  Continue to offload.   Pressure relief cushion on the recliner chair  Air mattress  Increase protein intake, under the care of RD  Goal of care : palliative      4. Traumatic injury to skin or subcutaneous tissue  Assessment & Plan:  Right lateral calf: improved,  100% eschar  Leave GRETA  Continue to offload  Follow-up : two weeks      5. Hospice care patient  Assessment & Plan:  Hospice care plan was chosen. Based on this plan, there is no expectation of healing. The end goals of our palliative care plan are pain control, drainage management, prevention of infection, odor control, and optimization of quality of life insofar  as the wound will allow. Therefore, invasive procedures related to the wound will be minimized. Dressings will be chosen to achieve the palliative care plan goals rather that to achieve healing of the wound.  Debridements may be performed periodically in order to prevent infection or control odor, etc.  Visits will be scheduled accordingly to minimize disruption of quality of life while allowing appropriate provider oversight of the wound and any dramatic changes that might occur.                              Subjective:   1/9/2024This is a 89 y.o., female referred to our service for wound/ skin alterations on bilateral lower legs and sacrum.Patient have a complex medical history including but not limited to closed fracture of left hip and hypokalemia. Patient was referred by Senior Care Team. Patient was seen in collaboration with the facility wound team.     Wound History: Patient is a poor historian and was not able to provide information related to the wound.  It is not clear where the wound on the bilateral lower legs came from.  Patient previously under the care of hospice.  The wound on the sacrum is pressure ulcer.    Received patient on recliner chair, seems comfortable.  Patient needs assistance with turning and repositioning in bed.  Patient is incontinent of both bowel and bladder.    1/17/2024 Follow up for multiple wounds . Received patient, not in distress. Facility staff did not report any significant issues related to the wound. Denies pain.     1/23/2024 Follow up for wound on the multiple wounds . Received patient, not in distress. Facility staff did not report any significant issues related to the wound. Treatment removed on the left lower leg anterior wound - xerofoam.     1/30/2024 Follow up for wound on the BLE and sacrum . Received patient, not in distress. Facility staff did not report any significant issues related to the wound. Denies pain. Dressing removed bordered foam on  BLE.    2/6/2024 Follow up for wound on the BLE and sacrum . Received patient, not in distress. Facility staff did not report any significant issues related to the wound. Patient had been spending all her time in recliner chair.     2/13/2024 Follow up for wound on the BLE and sacrum . Received patient, not in distress. Facility staff did not report any significant issues related to the wound. Denies pain. Patient currently under the care of [facility] hospice.     2/20/2024 Follow up for wound on the BLE and sacrum . Received patient, not in distress. Facility staff did not report any significant issues related to the wound. Currently under the care of hospice. Denies pain.     2/27/2024 Follow up for wound on the BLE and sacrum . Received patient, not in distress. Facility staff did not report any significant issues related to the wound. Patient had not been eating well, health is declining.                         Review of Systems   Constitutional: Negative.    Respiratory: Negative.     Cardiovascular: Negative.    Musculoskeletal:  Positive for gait problem.   Skin:  Positive for wound.   Psychiatric/Behavioral: Negative.         Objective:    Physical Exam  Constitutional:       Appearance: She is ill-appearing.      Comments: Seems frail   Pulmonary:      Comments: On oxygen supplement  Musculoskeletal:      Comments: LROM   Skin:     Findings: Lesion present.      Comments: Left lower leg anterior: Wound size is 5 x 1 cm.,  100% eschar, no drainage, no obvious sign of infection    Right lateral calf: Wound size is 2.5 x  1.3 cm.,  100% eschar, no drainage, with no obvious sign of infection    Sacrum: Wound size is 1 x 2.5  x 0.3 cm.,  50% slough, 50% granulation,  small amount of serous drainage, periwound is normal, with no obvious sign of infection              Procedures           Patient's care was coordinated with nursing facility staff. Recent vitals, labs and updated medications were reviewed on EMR or  chart system of facility. Past Medical, surgical, social, medication and allergy history and patient's previous records were reviewed and updated as appropriate: Most up-to date information is available in the facility EMR where the patient is currently admitted.    Patient Active Problem List   Diagnosis    Chronic diastolic heart failure (HCC)    Palliative care patient    Paroxysmal atrial fibrillation ([facility])    Primary hypertension    Stage 3a chronic kidney disease (CKD) ([facility])    Closed fracture of left hip (HCC)    Pressure injury of sacral region, stage 3 (HCC)    Hypokalemia    CKD (chronic kidney disease), stage IV ([facility])    Debility    Acquired hypothyroidism    Degloving injury    Ambulatory dysfunction    Traumatic injury to skin or subcutaneous tissue    Severe protein-calorie malnutrition ([facility])    Counseling regarding advance care planning and goals of care    Shortness of breath    Pressure injury of sacral region, unstageable ([facility])    Hospice care patient    Acute on chronic respiratory failure with hypoxia ([facility])     History reviewed. No pertinent past medical history.  History reviewed. No pertinent surgical history.  Social History     Socioeconomic History    Marital status: Single     Spouse name: None    Number of children: None    Years of education: None    Highest education level: None   Occupational History    None   Tobacco Use    Smoking status: Former     Types: Cigarettes    Smokeless tobacco: Never   Substance and Sexual Activity    Alcohol use: None    Drug use: None    Sexual activity: None   Other Topics Concern    None   Social History Narrative    None     Social Determinants of Health     Financial Resource Strain: Low Risk  (12/24/2023)    [facility]    Overall Financial Resource Strain (CARDIA)     Difficulty of Paying Living Expenses: Not very hard   Food Insecurity: No Food Insecurity (12/24/2023)    [facility]     Hunger Vital Sign     Worried About Running Out of Food in the Last Year: Never true     Ran Out of Food in the Last Year: Never true   Transportation Needs: No Transportation Needs (12/24/2023)    Received from [facility]    PRAPARE - Transportation     Lack of Transportation (Medical): No     Lack of Transportation (Non-Medical): No   Physical Activity: Not on file   Stress: Not on file   Social Connections: Not on file   Intimate Partner Violence: Not At Risk (12/24/2023)    Received from [facility]    Humiliation, Afraid, Rape, and Kick questionnaire     Fear of Current or Ex-Partner: No     Emotionally Abused: No     Physically Abused: No     Sexually Abused: No   Housing Stability: Low Risk  (12/24/2023)    Received from [facility]    Housing Stability Vital Sign     Unable to Pay for Housing in the Last Year: No     Number of Places Lived in the Last Year: 1     Unstable Housing in the Last Year: No        Current Outpatient Medications:     acetaminophen (TYLENOL) 325 mg tablet, Take 975 mg by mouth 3 (three) times a day. Morning, afternoon and evening doses  Indications: Pain, Disp: , Rfl:     apixaban (ELIQUIS) 2.5 mg, Take 2.5 mg by mouth 2 (two) times a day. Indications: Thromboembolism secondary to Atrial Fibrillation, Disp: , Rfl:     bisacodyl (Bisacodyl Laxative) 10 mg suppository, Insert 10 mg into the rectum daily as needed for constipation. Indications: Constipation, Disp: , Rfl:     furosemide (LASIX) 20 mg tablet, Take 20 mg by mouth daily. Indications: Cardiac Failure, Disp: , Rfl:     levothyroxine 88 mcg tablet, Take 88 mcg by mouth daily. Indications: Underactive Thyroid, Disp: , Rfl:     LORazepam (ATIVAN) 0.5 mg tablet, Take 0.5 mg by mouth every 4 (four) hours as needed for anxiety (anxiety/insomnia/dyspnea), Disp: , Rfl:     morphine 20 mg/mL concentrated solution, Take 5 mg by mouth every 2 (two) hours as needed for severe pain  "(Labored breathing, SOB, or pain) Hospice care., Disp: , Rfl:     oxygen gas, Inhale 2 L/min continuous. Indications: SOB, Disp: , Rfl:     polyethylene glycol (GLYCOLAX) 17 GM/SCOOP powder, Take 17 g by mouth daily as needed (Constipation). Indications: Constipation, Disp: , Rfl:     traMADol (ULTRAM) 50 mg tablet, Take 50 mg by mouth every 6 (six) hours as needed for moderate pain or severe pain. Indications: Pain, Disp: , Rfl:     zinc oxide (BALMEX) 11.3 % cream, Apply 1 Application topically daily as needed ([place] care), Disp: 340 g, Rfl: 0  History reviewed. No pertinent family history.           Coordination of Care: Wound team aware of the treatment plan. Facility nurse will provide wound treatment and monitor the wound for any changes.     Patient / Staff education : Staff was given education on sign of infection and pressure ulcer prevention. Staff verbalized understanding     Follow up :  PRN    Voice-recognition software may have been used in the preparation of this document. Occasional wrong word or \"sound-alike\" substitutions may have occurred due to the inherent limitations of voice recognition software. Interpretation should be guided by context.      YOAN Germain  "